# Patient Record
Sex: MALE | Race: WHITE | NOT HISPANIC OR LATINO | Employment: FULL TIME | URBAN - METROPOLITAN AREA
[De-identification: names, ages, dates, MRNs, and addresses within clinical notes are randomized per-mention and may not be internally consistent; named-entity substitution may affect disease eponyms.]

---

## 2023-08-03 PROBLEM — I21.19 STEMI INVOLVING OTH CORONARY ARTERY OF INFERIOR WALL: Status: ACTIVE | Noted: 2023-08-03

## 2023-08-04 PROBLEM — I21.11 ST ELEVATION MYOCARDIAL INFARCTION INVOLVING RIGHT CORONARY ARTERY: Status: ACTIVE | Noted: 2023-08-03

## 2023-08-04 PROBLEM — E66.09 CLASS 1 OBESITY DUE TO EXCESS CALORIES WITH SERIOUS COMORBIDITY AND BODY MASS INDEX (BMI) OF 31.0 TO 31.9 IN ADULT: Status: ACTIVE | Noted: 2023-08-04

## 2023-08-04 PROBLEM — E03.9 HYPOTHYROIDISM: Status: ACTIVE | Noted: 2023-08-04

## 2023-08-04 PROBLEM — E66.811 CLASS 1 OBESITY DUE TO EXCESS CALORIES WITH SERIOUS COMORBIDITY AND BODY MASS INDEX (BMI) OF 31.0 TO 31.9 IN ADULT: Status: ACTIVE | Noted: 2023-08-04

## 2023-08-14 ENCOUNTER — OFFICE VISIT (OUTPATIENT)
Dept: CARDIOLOGY | Facility: CLINIC | Age: 58
End: 2023-08-14
Payer: COMMERCIAL

## 2023-08-14 VITALS
WEIGHT: 228.19 LBS | HEIGHT: 71 IN | BODY MASS INDEX: 31.95 KG/M2 | DIASTOLIC BLOOD PRESSURE: 70 MMHG | OXYGEN SATURATION: 98 % | HEART RATE: 74 BPM | SYSTOLIC BLOOD PRESSURE: 132 MMHG

## 2023-08-14 DIAGNOSIS — I21.11 ST ELEVATION MYOCARDIAL INFARCTION INVOLVING RIGHT CORONARY ARTERY: Primary | ICD-10-CM

## 2023-08-14 DIAGNOSIS — E03.9 HYPOTHYROIDISM, UNSPECIFIED TYPE: ICD-10-CM

## 2023-08-14 DIAGNOSIS — E66.09 CLASS 1 OBESITY DUE TO EXCESS CALORIES WITH SERIOUS COMORBIDITY AND BODY MASS INDEX (BMI) OF 31.0 TO 31.9 IN ADULT: ICD-10-CM

## 2023-08-14 PROCEDURE — 99999 PR PBB SHADOW E&M-EST. PATIENT-LVL V: CPT | Mod: PBBFAC,,,

## 2023-08-14 PROCEDURE — 1159F PR MEDICATION LIST DOCUMENTED IN MEDICAL RECORD: ICD-10-PCS | Mod: CPTII,S$GLB,,

## 2023-08-14 PROCEDURE — 1159F MED LIST DOCD IN RCRD: CPT | Mod: CPTII,S$GLB,,

## 2023-08-14 PROCEDURE — 99214 OFFICE O/P EST MOD 30 MIN: CPT | Mod: S$GLB,,,

## 2023-08-14 PROCEDURE — 3075F PR MOST RECENT SYSTOLIC BLOOD PRESS GE 130-139MM HG: ICD-10-PCS | Mod: CPTII,S$GLB,,

## 2023-08-14 PROCEDURE — 3075F SYST BP GE 130 - 139MM HG: CPT | Mod: CPTII,S$GLB,,

## 2023-08-14 PROCEDURE — 3078F DIAST BP <80 MM HG: CPT | Mod: CPTII,S$GLB,,

## 2023-08-14 PROCEDURE — 1160F RVW MEDS BY RX/DR IN RCRD: CPT | Mod: CPTII,S$GLB,,

## 2023-08-14 PROCEDURE — 3008F PR BODY MASS INDEX (BMI) DOCUMENTED: ICD-10-PCS | Mod: CPTII,S$GLB,,

## 2023-08-14 PROCEDURE — 1111F DSCHRG MED/CURRENT MED MERGE: CPT | Mod: CPTII,S$GLB,,

## 2023-08-14 PROCEDURE — 99999 PR PBB SHADOW E&M-EST. PATIENT-LVL V: ICD-10-PCS | Mod: PBBFAC,,,

## 2023-08-14 PROCEDURE — 1160F PR REVIEW ALL MEDS BY PRESCRIBER/CLIN PHARMACIST DOCUMENTED: ICD-10-PCS | Mod: CPTII,S$GLB,,

## 2023-08-14 PROCEDURE — 3078F PR MOST RECENT DIASTOLIC BLOOD PRESSURE < 80 MM HG: ICD-10-PCS | Mod: CPTII,S$GLB,,

## 2023-08-14 PROCEDURE — 3008F BODY MASS INDEX DOCD: CPT | Mod: CPTII,S$GLB,,

## 2023-08-14 PROCEDURE — 1111F PR DISCHARGE MEDS RECONCILED W/ CURRENT OUTPATIENT MED LIST: ICD-10-PCS | Mod: CPTII,S$GLB,,

## 2023-08-14 PROCEDURE — 99214 PR OFFICE/OUTPT VISIT, EST, LEVL IV, 30-39 MIN: ICD-10-PCS | Mod: S$GLB,,,

## 2023-08-14 NOTE — PROGRESS NOTES
Subjective:    Patient ID:  Saroj Zavala III is a 57 y.o. male who presents for follow-up of Hospital Follow Up      PCP: Shabbir Kirkland III, MD (Inactive)     Referring Provider:     HPI: Patient is a 56 yo m w/PMH hypothyroidism, obesity,  inferior STEMI 8/3/23 S/P PCI mid RCA w OSVALDO X 1 Synergy XD 3.5x20MM by Dr. Kirkland. Patient presented to ED on 8/3/23 w c/o CP which started 3-4 HRs prior to arrival and he had stuttering cp which became constant over the past hour.  He denies sob however notes radiation of pain to his back and L arm. He was noted to be hemodynamically stable in the ED and given ASA, ticagrelor, and heparin bolus and was sent for emergent angiogram. Post angiogram received Tirofiban X 18 HR, repeat EKG reviewed w resolution of ST elevation. He was discharged home on medical therapy: Ticagrelor, ASA, metoprolol 25 mg, and Statin therapy.Since discharge patient reports doing well but notes some fatigue.  He denies CP, SOB, palpitations, orthopnea, PND, pre-syncope, LOC, or swelling. He notes medication compliance without side effects.         No past medical history on file.  Past Surgical History:   Procedure Laterality Date    IVUS, CORONARY  8/3/2023    Procedure: IVUS, Coronary;  Surgeon: Mt Kirkland III, MD;  Location: [facility] CATH LAB;  Service: Cardiology;;    LEFT HEART CATHETERIZATION Left 8/3/2023    Procedure: Left heart cath;  Surgeon: Mt Kirkland III, MD;  Location: [facility] CATH LAB;  Service: Cardiology;  Laterality: Left;    STENT, DRUG ELUTING, SINGLE VESSEL, CORONARY  8/3/2023    Procedure: Stent, Drug Eluting, Single Vessel, Coronary;  Surgeon: Mt Kirkland III, MD;  Location: [facility] CATH LAB;  Service: Cardiology;;     Social History     Socioeconomic History    Marital status:      Social Determinants of Health     Financial Resource Strain: Medium Risk (8/4/2023)    Overall Financial Resource Strain (CARDIA)     Difficulty of Paying Living  Expenses: Somewhat hard   Food Insecurity: No Food Insecurity (8/4/2023)    Hunger Vital Sign     Worried About Running Out of Food in the Last Year: Never true     Ran Out of Food in the Last Year: Never true   Transportation Needs: No Transportation Needs (8/4/2023)    PRAPARE - Transportation     Lack of Transportation (Medical): No     Lack of Transportation (Non-Medical): No   Physical Activity: Insufficiently Active (8/4/2023)    Exercise Vital Sign     Days of Exercise per Week: 3 days     Minutes of Exercise per Session: 40 min   Stress: Stress Concern Present (8/4/2023)    [employer] [place] [employer] - Occupational Stress Questionnaire     Feeling of Stress : Rather much   Social Connections: Unknown (8/4/2023)    Social Connection and Isolation Panel [NHANES]     Frequency of Communication with Friends and Family: More than three times a week     Frequency of Social Gatherings with Friends and Family: More than three times a week   Housing Stability: Unknown (8/4/2023)    Housing Stability Vital Sign     Unstable Housing in the Last Year: No     No family history on file.    Review of patient's allergies indicates:  No Known Allergies    Medication List with Changes/Refills   Current Medications    ASPIRIN 81 MG CHEW    Take 1 tablet (81 mg total) by mouth once daily.    ATORVASTATIN (LIPITOR) 40 MG TABLET    Take 1 tablet (40 mg total) by mouth every evening.    LEVOTHYROXINE (SYNTHROID, LEVOTHROID) 175 MCG TABLET    Take 175 mcg by mouth once daily.    METOPROLOL TARTRATE (LOPRESSOR) 25 MG TABLET    Take 1 tablet (25 mg total) by mouth 2 (two) times daily.    TESTOSTERONE CYPIONATE (DEPOTESTOTERONE CYPIONATE) 100 MG/ML INJECTION    Inject into the muscle every 14 (fourteen) days.   Changed and/or Refilled Medications    Modified Medication Previous Medication    TICAGRELOR (BRILINTA) 90 MG TABLET ticagrelor (BRILINTA) 90 mg tablet       Take 1 tablet (90 mg total) by mouth 2 (two) times  "daily.    Take 1 tablet (90 mg total) by mouth 2 (two) times daily.       Review of Systems   Constitutional: Negative for diaphoresis and fever.   HENT:  Negative for congestion and hearing loss.    Eyes:  Negative for blurred vision and pain.   Cardiovascular:  Negative for chest pain, claudication, dyspnea on exertion, leg swelling, near-syncope, palpitations and syncope.   Respiratory:  Negative for shortness of breath and sleep disturbances due to breathing.    Hematologic/Lymphatic: Negative for bleeding problem. Does not bruise/bleed easily.   Skin:  Negative for color change and poor wound healing.   Gastrointestinal:  Negative for abdominal pain and nausea.   Genitourinary:  Negative for bladder incontinence and flank pain.   Neurological:  Negative for focal weakness and light-headedness.        Objective:   /70 (BP Location: Right arm, Patient Position: Sitting, BP Method: X-Large (Manual))   Pulse 74   Ht 5' 11" (1.803 m)   Wt 103.5 kg (228 lb 3.2 oz)   SpO2 98%   BMI 31.83 kg/m²    Physical Exam  Constitutional:       Appearance: He is well-developed. He is not diaphoretic.   HENT:      Head: Normocephalic and atraumatic.   Eyes:      General: No scleral icterus.     Pupils: Pupils are equal, round, and reactive to light.   Neck:      Vascular: No JVD.   Cardiovascular:      Rate and Rhythm: Normal rate and regular rhythm.      Pulses: Intact distal pulses.      Heart sounds: S1 normal and S2 normal. No murmur heard.     No friction rub. No gallop.   Pulmonary:      Effort: Pulmonary effort is normal. No respiratory distress.      Breath sounds: Normal breath sounds. No wheezing or rales.   Chest:      Chest wall: No tenderness.   Abdominal:      General: Bowel sounds are normal. There is no distension.      Palpations: Abdomen is soft. There is no mass.      Tenderness: There is no abdominal tenderness. There is no rebound.   Musculoskeletal:         General: No tenderness. Normal range of " motion.      Cervical back: Normal range of motion and neck supple.   Skin:     General: Skin is warm and dry.      Coloration: Skin is not pale.   Neurological:      Mental Status: He is alert and oriented to person, place, and time.      Coordination: Coordination normal.      Deep Tendon Reflexes: Reflexes normal.   Psychiatric:         Behavior: Behavior normal.         Judgment: Judgment normal.           Assessment:       1. ST elevation myocardial infarction involving right coronary artery    2. Class 1 obesity due to excess calories with serious comorbidity and body mass index (BMI) of 31.0 to 31.9 in adult    3. Hypothyroidism, unspecified type         Plan:         ST elevation myocardial infarction involving right coronary artery  -S/P [facility] 8/3/23  Summary         There was single vessel coronary artery disease with a 75% thrombotic tubular stenosis of the mid RCA with distal embolization to daital RPLV status post successful PCI with a 3.5x20 mm OSVALDO with postdilation with a 4.0x20 mm NCB with excellent angiographic results.    The Mid RCA lesion was 75% stenosed with 0% stenosis post-intervention.    The left ventricular end diastolic pressure was elevated, LVEDP 22 mmHg.    The pre-procedure left ventricular end diastolic pressure was 22.    Mid RCA lesion: A STENT SYNERGY XD 3.5X20MM stent was successfully placed at 16 NABOR for 20 sec.    The estimated blood loss was <50 mL    -continue ASA 81 mg, metoprolol 25 mg  -continue Ticagrelor X 1 year   -Cardiac rehab referral.  -continue Statin therapy- atorvastatin 40 mg         Class 1 obesity due to excess calories with serious comorbidity and body mass index (BMI) of 31.0 to 31.9 in adult  -discussed lifestyle modifications- diet exercise, weight loss  -notes he has made diet modifications to cardiac healthy diet     Hypothyroidism  -followed by PCP  -Continue medical therapy       Total duration of face to face visit time 30 minutes.  Total time spent  counseling greater than fifty percent of total visit time.  Counseling included discussion regarding imaging findings, diagnosis, possibilities, treatment options, risks and benefits.  The patient had many questions regarding the options and long-term effects      Taran Zhu NP  Cardiology

## 2023-08-14 NOTE — ASSESSMENT & PLAN NOTE
-discussed lifestyle modifications- diet exercise, weight loss  -notes he has made diet modifications to cardiac healthy diet

## 2023-08-14 NOTE — ASSESSMENT & PLAN NOTE
-S/P [facility] 8/3/23  Summary         There was single vessel coronary artery disease with a 75% thrombotic tubular stenosis of the mid RCA with distal embolization to daital RPLV status post successful PCI with a 3.5x20 mm OSVALDO with postdilation with a 4.0x20 mm NCB with excellent angiographic results.    The Mid RCA lesion was 75% stenosed with 0% stenosis post-intervention.    The left ventricular end diastolic pressure was elevated, LVEDP 22 mmHg.    The pre-procedure left ventricular end diastolic pressure was 22.    Mid RCA lesion: A STENT SYNERGY XD 3.5X20MM stent was successfully placed at 16 NABOR for 20 sec.    The estimated blood loss was <50 mL    -continue ASA 81 mg, metoprolol 25 mg  -continue Ticagrelor X 1 year   -Cardiac rehab referral.  -continue Statin therapy- atorvastatin 40 mg

## 2023-11-06 PROBLEM — I21.11 ST ELEVATION MYOCARDIAL INFARCTION INVOLVING RIGHT CORONARY ARTERY: Status: RESOLVED | Noted: 2023-08-03 | Resolved: 2023-11-06

## 2023-11-21 ENCOUNTER — OFFICE VISIT (OUTPATIENT)
Dept: CARDIOLOGY | Facility: CLINIC | Age: 58
End: 2023-11-21
Payer: COMMERCIAL

## 2023-11-21 VITALS
DIASTOLIC BLOOD PRESSURE: 80 MMHG | BODY MASS INDEX: 31.92 KG/M2 | OXYGEN SATURATION: 99 % | HEIGHT: 71 IN | HEART RATE: 74 BPM | SYSTOLIC BLOOD PRESSURE: 138 MMHG | WEIGHT: 228 LBS

## 2023-11-21 DIAGNOSIS — I25.10 ATHEROSCLEROSIS OF NATIVE CORONARY ARTERY OF NATIVE HEART WITHOUT ANGINA PECTORIS: Primary | ICD-10-CM

## 2023-11-21 DIAGNOSIS — E03.9 HYPOTHYROIDISM, UNSPECIFIED TYPE: ICD-10-CM

## 2023-11-21 DIAGNOSIS — G47.33 OSA (OBSTRUCTIVE SLEEP APNEA): ICD-10-CM

## 2023-11-21 DIAGNOSIS — R53.83 OTHER FATIGUE: ICD-10-CM

## 2023-11-21 DIAGNOSIS — E66.09 CLASS 1 OBESITY DUE TO EXCESS CALORIES WITH SERIOUS COMORBIDITY AND BODY MASS INDEX (BMI) OF 31.0 TO 31.9 IN ADULT: ICD-10-CM

## 2023-11-21 PROCEDURE — 99999 PR PBB SHADOW E&M-EST. PATIENT-LVL III: ICD-10-PCS | Mod: PBBFAC,,,

## 2023-11-21 PROCEDURE — 1159F MED LIST DOCD IN RCRD: CPT | Mod: CPTII,S$GLB,,

## 2023-11-21 PROCEDURE — 3079F PR MOST RECENT DIASTOLIC BLOOD PRESSURE 80-89 MM HG: ICD-10-PCS | Mod: CPTII,S$GLB,,

## 2023-11-21 PROCEDURE — 3075F SYST BP GE 130 - 139MM HG: CPT | Mod: CPTII,S$GLB,,

## 2023-11-21 PROCEDURE — 99999 PR PBB SHADOW E&M-EST. PATIENT-LVL III: CPT | Mod: PBBFAC,,,

## 2023-11-21 PROCEDURE — 3075F PR MOST RECENT SYSTOLIC BLOOD PRESS GE 130-139MM HG: ICD-10-PCS | Mod: CPTII,S$GLB,,

## 2023-11-21 PROCEDURE — 1159F PR MEDICATION LIST DOCUMENTED IN MEDICAL RECORD: ICD-10-PCS | Mod: CPTII,S$GLB,,

## 2023-11-21 PROCEDURE — 3008F PR BODY MASS INDEX (BMI) DOCUMENTED: ICD-10-PCS | Mod: CPTII,S$GLB,,

## 2023-11-21 PROCEDURE — 99214 PR OFFICE/OUTPT VISIT, EST, LEVL IV, 30-39 MIN: ICD-10-PCS | Mod: S$GLB,,,

## 2023-11-21 PROCEDURE — 3008F BODY MASS INDEX DOCD: CPT | Mod: CPTII,S$GLB,,

## 2023-11-21 PROCEDURE — 99214 OFFICE O/P EST MOD 30 MIN: CPT | Mod: S$GLB,,,

## 2023-11-21 PROCEDURE — 3079F DIAST BP 80-89 MM HG: CPT | Mod: CPTII,S$GLB,,

## 2023-11-21 RX ORDER — METOPROLOL TARTRATE 25 MG/1
12.5 TABLET, FILM COATED ORAL 2 TIMES DAILY
Qty: 45 TABLET | Refills: 3 | Status: SHIPPED | OUTPATIENT
Start: 2023-11-21 | End: 2024-11-20

## 2023-11-21 NOTE — PROGRESS NOTES
Subjective:    Patient ID:  Saroj Zavala III is a 58 y.o. male who presents for follow-up of Follow-up (3 month f/u)      PCP: Shabbir Kirkland III, MD (Inactive)     Referring Provider:     HPI: Patient is a 58 yo m w/PMH hypothyroidism, obesity,  inferior STEMI 8/3/23 S/P PCI mid RCA w OSVALDO X 1 Synergy XD 3.5x20MM by Dr. Kirkland. Patient presented to ED on 8/3/23 w c/o CP which started 3-4 HRs prior to arrival and he had stuttering cp which became constant over the past hour.  He denies sob however notes radiation of pain to his back and L arm. He was noted to be hemodynamically stable in the ED and given ASA, ticagrelor, and heparin bolus and was sent for emergent angiogram. Post angiogram received Tirofiban X 18 HR, repeat EKG reviewed w resolution of ST elevation. He was discharged home on medical therapy: Ticagrelor, ASA, metoprolol 25 mg, and Statin therapy.Since discharge patient reports doing well but notes some fatigue.  He denies CP, SOB, palpitations, orthopnea, PND, pre-syncope, LOC, or swelling. He notes medication compliance without side effects.       11/21/23: Patient presents today for f/u appt. He was last seen on 8/14/23 S/P PCI to mid RCA and was continued on medical therapy. He reports doing well since last visit. He also reports feeling very sleepy and slow. He noted 1 episode of right sided CP which was relieved by 1 SL nitro.He denies CP, SOB, palpitations, orthopnea, PND, pre-syncope, LOC, or swelling. He notes medication compliance without side effects. He notes intermittent dietary compliance with low fat/ low cholesterol diet.          No past medical history on file.  Past Surgical History:   Procedure Laterality Date    IVUS, CORONARY  8/3/2023    Procedure: IVUS, Coronary;  Surgeon: Mt Kirkland III, MD;  Location: [facility] CATH LAB;  Service: Cardiology;;    LEFT HEART CATHETERIZATION Left 8/3/2023    Procedure: Left heart cath;  Surgeon: Mt Kirkland III, MD;   Location: [facility] CATH LAB;  Service: Cardiology;  Laterality: Left;    STENT, DRUG ELUTING, SINGLE VESSEL, CORONARY  8/3/2023    Procedure: Stent, Drug Eluting, Single Vessel, Coronary;  Surgeon: Mt Kirkland III, MD;  Location: [facility] CATH LAB;  Service: Cardiology;;     Social History     Socioeconomic History    Marital status:      Social Determinants of Health     Financial Resource Strain: Medium Risk (8/4/2023)    Overall Financial Resource Strain (CARDIA)     Difficulty of Paying Living Expenses: Somewhat hard   Food Insecurity: No Food Insecurity (8/4/2023)    Hunger Vital Sign     Worried About Running Out of Food in the Last Year: Never true     Ran Out of Food in the Last Year: Never true   Transportation Needs: No Transportation Needs (8/4/2023)    PRAPARE - Transportation     Lack of Transportation (Medical): No     Lack of Transportation (Non-Medical): No   Physical Activity: Insufficiently Active (8/4/2023)    Exercise Vital Sign     Days of Exercise per Week: 3 days     Minutes of Exercise per Session: 40 min   Stress: Stress Concern Present (8/4/2023)    [employer] [employer] - Occupational Stress Questionnaire     Feeling of Stress : Rather much   Social Connections: Unknown (8/4/2023)    Social Connection and Isolation Panel [NHANES]     Frequency of Communication with Friends and Family: More than three times a week     Frequency of Social Gatherings with Friends and Family: More than three times a week   Housing Stability: Unknown (8/4/2023)    Housing Stability Vital Sign     Unstable Housing in the Last Year: No     No family history on file.    Review of patient's allergies indicates:  No Known Allergies    Medication List with Changes/Refills   New Medications    METOPROLOL TARTRATE (LOPRESSOR) 25 MG TABLET    Take 0.5 tablets (12.5 mg total) by mouth 2 (two) times daily.   Current Medications    ASPIRIN 81 MG CHEW    Take 1 tablet (81 mg total) by mouth once  "daily.    ATORVASTATIN (LIPITOR) 40 MG TABLET    Take 1 tablet (40 mg total) by mouth every evening.    LEVOTHYROXINE (SYNTHROID, LEVOTHROID) 175 MCG TABLET    Take 175 mcg by mouth once daily.    TESTOSTERONE CYPIONATE (DEPOTESTOTERONE CYPIONATE) 100 MG/ML INJECTION    Inject into the muscle every 14 (fourteen) days.    TICAGRELOR (BRILINTA) 90 MG TABLET    Take 1 tablet (90 mg total) by mouth 2 (two) times daily.   Discontinued Medications    METOPROLOL TARTRATE (LOPRESSOR) 25 MG TABLET    Take 1 tablet (25 mg total) by mouth 2 (two) times daily.       Review of Systems   Constitutional: Negative for diaphoresis and fever.   HENT:  Negative for congestion and hearing loss.    Eyes:  Negative for blurred vision and pain.   Cardiovascular:  Negative for chest pain, claudication, dyspnea on exertion, leg swelling, near-syncope, palpitations and syncope.   Respiratory:  Negative for shortness of breath and sleep disturbances due to breathing.    Hematologic/Lymphatic: Negative for bleeding problem. Does not bruise/bleed easily.   Skin:  Negative for color change and poor wound healing.   Gastrointestinal:  Negative for abdominal pain and nausea.   Genitourinary:  Negative for bladder incontinence and flank pain.   Neurological:  Positive for excessive daytime sleepiness. Negative for focal weakness and light-headedness.        Objective:   /80 (BP Location: Left arm, Patient Position: Sitting, BP Method: Large (Manual))   Pulse 74   Ht 5' 11" (1.803 m)   Wt 103.4 kg (228 lb)   SpO2 99%   BMI 31.80 kg/m²    Physical Exam  Constitutional:       Appearance: He is well-developed. He is not diaphoretic.   HENT:      Head: Normocephalic and atraumatic.   Eyes:      General: No scleral icterus.     Pupils: Pupils are equal, round, and reactive to light.   Neck:      Vascular: No JVD.   Cardiovascular:      Rate and Rhythm: Normal rate and regular rhythm.      Pulses: Intact distal pulses.      Heart sounds: S1 " normal and S2 normal. No murmur heard.     No friction rub. No gallop.   Pulmonary:      Effort: Pulmonary effort is normal. No respiratory distress.      Breath sounds: Normal breath sounds. No wheezing or rales.   Chest:      Chest wall: No tenderness.   Abdominal:      General: Bowel sounds are normal. There is no distension.      Palpations: Abdomen is soft. There is no mass.      Tenderness: There is no abdominal tenderness. There is no rebound.   Musculoskeletal:         General: No tenderness. Normal range of motion.      Cervical back: Normal range of motion and neck supple.   Skin:     General: Skin is warm and dry.      Coloration: Skin is not pale.   Neurological:      Mental Status: He is alert and oriented to person, place, and time.      Coordination: Coordination normal.      Deep Tendon Reflexes: Reflexes normal.   Psychiatric:         Behavior: Behavior normal.         Judgment: Judgment normal.             Cardiac echo 8/4/23  Summary         Left Ventricle: The left ventricle is normal in size. Normal wall thickness. Normal wall motion. There is normal systolic function with a visually estimated ejection fraction of 60 - 65%. There is normal diastolic function.    Right Ventricle: Normal right ventricular cavity size. Systolic function is normal.    Aortic Valve: The aortic valve is a trileaflet valve. There is mild aortic regurgitation with a centrally directed jet.    Mitral Valve: There is no stenosis. There is mild regurgitation with a centrally directed jet.    Tricuspid Valve: There is mild regurgitation with a centrally directed jet.    Pulmonic Valve: There is no significant stenosis. The estimated PA systolic pressure is 36 mmHg.    IVC/SVC: Intermediate venous pressure at 8 mmHg.      Cardiac angiogram 8/3/23    Summary         There was single vessel coronary artery disease with a 75% thrombotic tubular stenosis of the mid RCA with distal embolization to daital RPLV status post  successful PCI with a 3.5x20 mm OSVALDO with postdilation with a 4.0x20 mm NCB with excellent angiographic results.    The Mid RCA lesion was 75% stenosed with 0% stenosis post-intervention.    The left ventricular end diastolic pressure was elevated, LVEDP 22 mmHg.    The pre-procedure left ventricular end diastolic pressure was 22.    Mid RCA lesion: A STENT SYNERGY XD 3.5X20MM stent was successfully placed at 16 NABOR for 20 sec.    The estimated blood loss was <50 mL.    Recommendations     Routine post PCI care.   Maximize medical management.   ASA 81mg.   Cardiac rehab referral.   Weight loss.   Statin therapy.   Ticagrelor (Brilinta) for one year.         Assessment:       1. Atherosclerosis of native coronary artery of native heart without angina pectoris    2. Hypothyroidism, unspecified type    3. Class 1 obesity due to excess calories with serious comorbidity and body mass index (BMI) of 31.0 to 31.9 in adult    4. ZOE (obstructive sleep apnea)    5. Other fatigue           Plan:         Atherosclerosis of native coronary artery of native heart without angina pectoris  S/P [facility] 8/3/23  Summary     There was single vessel coronary artery disease with a 75% thrombotic tubular stenosis of the mid RCA with distal embolization to daital RPLV status post successful PCI with a 3.5x20 mm OSVALDO with postdilation with a 4.0x20 mm NCB with excellent angiographic results.    The Mid RCA lesion was 75% stenosed with 0% stenosis post-intervention.    Mid RCA lesion: A STENT SYNERGY XD 3.5X20MM stent was successfully placed at 16 NABOR for 20 sec     -continue ASA 81 mg,  -decrease  metoprolol to 12.5 mg as patient reports sleepiness and tired feeling   -continue Ticagrelor X 1 year   -Cardiac rehab referral.  -continue Statin therapy- atorvastatin 40 mg     Hypothyroidism  -followed by PCP  -Continue medical therapy- synthroid 175 mcg     Class 1 obesity due to excess calories with serious comorbidity and body mass index (BMI) of  31.0 to 31.9 in adult  -discussed lifestyle modifications- diet exercise, weight loss  -notes he has made diet modifications to cardiac healthy diet     ZOE (obstructive sleep apnea)  -compliant w nightly CPAP     Other fatigue  -Cardiac echo to evaluate heart function         Total duration of face to face visit time 30 minutes.  Total time spent counseling greater than fifty percent of total visit time.  Counseling included discussion regarding imaging findings, diagnosis, possibilities, treatment options, risks and benefits.  The patient had many questions regarding the options and long-term effects      Taran Zhu NP  Cardiology

## 2023-11-21 NOTE — ASSESSMENT & PLAN NOTE
S/P [facility] 8/3/23  Summary     There was single vessel coronary artery disease with a 75% thrombotic tubular stenosis of the mid RCA with distal embolization to daital RPLV status post successful PCI with a 3.5x20 mm OSVALDO with postdilation with a 4.0x20 mm NCB with excellent angiographic results.    The Mid RCA lesion was 75% stenosed with 0% stenosis post-intervention.    Mid RCA lesion: A STENT SYNERGY XD 3.5X20MM stent was successfully placed at 16 NABOR for 20 sec     -continue ASA 81 mg,  -decrease  metoprolol to 12.5 mg as patient reports sleepiness and tired feeling   -continue Ticagrelor X 1 year   -Cardiac rehab referral.  -continue Statin therapy- atorvastatin 40 mg

## 2024-02-21 ENCOUNTER — OFFICE VISIT (OUTPATIENT)
Dept: CARDIOLOGY | Facility: CLINIC | Age: 59
End: 2024-02-21
Payer: COMMERCIAL

## 2024-02-21 VITALS
SYSTOLIC BLOOD PRESSURE: 136 MMHG | HEART RATE: 62 BPM | OXYGEN SATURATION: 96 % | WEIGHT: 227.94 LBS | HEIGHT: 71 IN | DIASTOLIC BLOOD PRESSURE: 80 MMHG | BODY MASS INDEX: 31.91 KG/M2

## 2024-02-21 DIAGNOSIS — R53.83 OTHER FATIGUE: ICD-10-CM

## 2024-02-21 DIAGNOSIS — R06.02 SOBOE (SHORTNESS OF BREATH ON EXERTION): ICD-10-CM

## 2024-02-21 DIAGNOSIS — E03.9 HYPOTHYROIDISM, UNSPECIFIED TYPE: ICD-10-CM

## 2024-02-21 DIAGNOSIS — E66.09 CLASS 1 OBESITY DUE TO EXCESS CALORIES WITH SERIOUS COMORBIDITY AND BODY MASS INDEX (BMI) OF 31.0 TO 31.9 IN ADULT: ICD-10-CM

## 2024-02-21 DIAGNOSIS — I25.10 ATHEROSCLEROSIS OF NATIVE CORONARY ARTERY OF NATIVE HEART WITHOUT ANGINA PECTORIS: Primary | ICD-10-CM

## 2024-02-21 DIAGNOSIS — G47.33 OSA (OBSTRUCTIVE SLEEP APNEA): ICD-10-CM

## 2024-02-21 PROCEDURE — 1160F RVW MEDS BY RX/DR IN RCRD: CPT | Mod: CPTII,S$GLB,,

## 2024-02-21 PROCEDURE — 99999 PR PBB SHADOW E&M-EST. PATIENT-LVL III: CPT | Mod: PBBFAC,,,

## 2024-02-21 PROCEDURE — 99214 OFFICE O/P EST MOD 30 MIN: CPT | Mod: S$GLB,,,

## 2024-02-21 PROCEDURE — 1159F MED LIST DOCD IN RCRD: CPT | Mod: CPTII,S$GLB,,

## 2024-02-21 PROCEDURE — 3079F DIAST BP 80-89 MM HG: CPT | Mod: CPTII,S$GLB,,

## 2024-02-21 PROCEDURE — 3075F SYST BP GE 130 - 139MM HG: CPT | Mod: CPTII,S$GLB,,

## 2024-02-21 PROCEDURE — 3008F BODY MASS INDEX DOCD: CPT | Mod: CPTII,S$GLB,,

## 2024-02-21 RX ORDER — ATORVASTATIN CALCIUM 40 MG/1
1 TABLET, FILM COATED ORAL NIGHTLY
COMMUNITY
Start: 2023-08-05 | End: 2024-02-21 | Stop reason: SDUPTHER

## 2024-02-21 RX ORDER — LEVOTHYROXINE SODIUM 175 UG/1
175 TABLET ORAL
COMMUNITY
Start: 2023-11-20 | End: 2024-02-21 | Stop reason: SDUPTHER

## 2024-02-21 NOTE — ASSESSMENT & PLAN NOTE
-Cardiac echo 11/24/23- LVEF 55% w normal diastolic function   -Refer to Pulmonary medicine for further evaluation

## 2024-02-21 NOTE — ASSESSMENT & PLAN NOTE
S/P [facility] 8/3/23  Summary     There was single vessel coronary artery disease with a 75% thrombotic tubular stenosis of the mid RCA with distal embolization to daital RPLV status post successful PCI with a 3.5x20 mm OSVALDO with postdilation with a 4.0x20 mm NCB with excellent angiographic results.    The Mid RCA lesion was 75% stenosed with 0% stenosis post-intervention.    Mid RCA lesion: A STENT SYNERGY XD 3.5X20MM stent was successfully placed at 16 NABOR for 20 sec     -continue ASA 81 mg,  -continue metoprolol to 12.5 mg as patient reports sleepiness and tired feeling   -continue Ticagrelor X 1 year   -Cardiac rehab referral.  -continue Statin therapy- atorvastatin 40 mg

## 2024-02-21 NOTE — PROGRESS NOTES
Subjective:    Patient ID:  Saroj Zavala III is a 58 y.o. male who presents for follow-up of No chief complaint on file.      PCP: Shabbir Kirkland III, MD (Inactive)     Referring Provider:     HPI: Patient is a 56 yo m w/PMH hypothyroidism, obesity,  inferior STEMI 8/3/23 S/P PCI mid RCA w OSVALDO X 1 Synergy XD 3.5x20MM by Dr. Kirkland. Patient presented to ED on 8/3/23 w c/o CP which started 3-4 HRs prior to arrival and he had stuttering cp which became constant over the past hour.  He denies sob however notes radiation of pain to his back and L arm. He was noted to be hemodynamically stable in the ED and given ASA, ticagrelor, and heparin bolus and was sent for emergent angiogram. Post angiogram received Tirofiban X 18 HR, repeat EKG reviewed w resolution of ST elevation. He was discharged home on medical therapy: Ticagrelor, ASA, metoprolol 25 mg, and Statin therapy.Since discharge patient reports doing well but notes some fatigue.  He denies CP, SOB, palpitations, orthopnea, PND, pre-syncope, LOC, or swelling. He notes medication compliance without side effects.       11/21/23: Patient presents today for f/u appt. He was last seen on 8/14/23 S/P PCI to mid RCA and was continued on medical therapy. He reports doing well since last visit. He also reports feeling very sleepy and slow. He noted 1 episode of right sided CP which was relieved by 1 SL nitro.He denies CP, SOB, palpitations, orthopnea, PND, pre-syncope, LOC, or swelling. He notes medication compliance without side effects. He notes intermittent dietary compliance with low fat/ low cholesterol diet.     2/24/24: Patient presents today for f/u appt. He was last seen on 11/21/23 for f/u appt and reported fatigue. He was continued on medical therapy w metoprolol decreased to 12.5 mg. Repeat cardiac echo w normal LVEF 55%. He continues to note some GREEN w exertion, but notes the fatigue has decreased with the lower dose of metoprolol.   He denies CP,  SOB, palpitations, orthopnea, PND, pre-syncope, LOC, or swelling. He notes medication compliance without side effects. He notes intermittent dietary compliance with low fat/ low cholesterol diet.     No past medical history on file.  Past Surgical History:   Procedure Laterality Date    IVUS, CORONARY  8/3/2023    Procedure: IVUS, Coronary;  Surgeon: Mt Kirkland III, MD;  Location: [facility] CATH LAB;  Service: Cardiology;;    LEFT HEART CATHETERIZATION Left 8/3/2023    Procedure: Left heart cath;  Surgeon: Mt Kirkland III, MD;  Location: [facility] CATH LAB;  Service: Cardiology;  Laterality: Left;    STENT, DRUG ELUTING, SINGLE VESSEL, CORONARY  8/3/2023    Procedure: Stent, Drug Eluting, Single Vessel, Coronary;  Surgeon: Mt Kirkland III, MD;  Location: [facility] CATH LAB;  Service: Cardiology;;     Social History     Socioeconomic History    Marital status:    Tobacco Use    Smoking status: Never    Smokeless tobacco: Never     Social Determinants of Health     Financial Resource Strain: Medium Risk (8/4/2023)    Overall Financial Resource Strain (CARDIA)     Difficulty of Paying Living Expenses: Somewhat hard   Food Insecurity: No Food Insecurity (8/4/2023)    Hunger Vital Sign     Worried About Running Out of Food in the Last Year: Never true     Ran Out of Food in the Last Year: Never true   Transportation Needs: No Transportation Needs (8/4/2023)    PRAPARE - Transportation     Lack of Transportation (Medical): No     Lack of Transportation (Non-Medical): No   Physical Activity: Insufficiently Active (8/4/2023)    Exercise Vital Sign     Days of Exercise per Week: 3 days     Minutes of Exercise per Session: 40 min   Stress: Stress Concern Present (8/4/2023)    Citizen of [place] [employer] - Occupational Stress Questionnaire     Feeling of Stress : Rather much   Social Connections: Unknown (8/4/2023)    Social Connection and Isolation Panel [NHANES]     Frequency of Communication  with Friends and Family: More than three times a week     Frequency of Social Gatherings with Friends and Family: More than three times a week   Housing Stability: Unknown (8/4/2023)    Housing Stability Vital Sign     Unstable Housing in the Last Year: No     No family history on file.    Review of patient's allergies indicates:  No Known Allergies    Medication List with Changes/Refills   Current Medications    ALBUTEROL (PROVENTIL/VENTOLIN HFA) 90 MCG/ACTUATION INHALER    Inhale 2 puffs into the lungs every 6 (six) hours as needed for Wheezing (Cough).    ASPIRIN 81 MG CHEW    Take 1 tablet (81 mg total) by mouth once daily.    ATORVASTATIN (LIPITOR) 40 MG TABLET    Take 1 tablet (40 mg total) by mouth every evening.    LEVOTHYROXINE (SYNTHROID, LEVOTHROID) 175 MCG TABLET    Take 175 mcg by mouth once daily.    METOPROLOL TARTRATE (LOPRESSOR) 25 MG TABLET    Take 0.5 tablets (12.5 mg total) by mouth 2 (two) times daily.    TESTOSTERONE CYPIONATE IM    Inject 50 mg into the muscle.    TICAGRELOR (BRILINTA) 90 MG TABLET    Take 1 tablet (90 mg total) by mouth 2 (two) times daily.   Discontinued Medications    ATORVASTATIN (LIPITOR) 40 MG TABLET    Take 1 tablet by mouth every evening.    LEVOTHYROXINE (SYNTHROID, LEVOTHROID) 175 MCG TABLET    Take 175 mcg by mouth.    TESTOSTERONE CYPIONATE (DEPOTESTOTERONE CYPIONATE) 100 MG/ML INJECTION    Inject into the muscle every 14 (fourteen) days.       Review of Systems   Constitutional: Negative for diaphoresis and fever.   HENT:  Negative for congestion and hearing loss.    Eyes:  Negative for blurred vision and pain.   Cardiovascular:  Positive for dyspnea on exertion. Negative for chest pain, claudication, leg swelling, near-syncope, palpitations and syncope.   Respiratory:  Positive for snoring. Negative for shortness of breath and sleep disturbances due to breathing.    Hematologic/Lymphatic: Negative for bleeding problem. Does not bruise/bleed easily.   Skin:   "Negative for color change and poor wound healing.   Gastrointestinal:  Negative for abdominal pain and nausea.   Genitourinary:  Negative for bladder incontinence and flank pain.   Neurological:  Negative for focal weakness and light-headedness.        Objective:   /80 (BP Location: Right arm, Patient Position: Sitting, BP Method: Medium (Manual))   Pulse 62   Ht 5' 11" (1.803 m)   Wt 103.4 kg (227 lb 15.3 oz)   SpO2 96%   BMI 31.79 kg/m²    Physical Exam  Constitutional:       Appearance: He is well-developed. He is not diaphoretic.   HENT:      Head: Normocephalic and atraumatic.   Eyes:      General: No scleral icterus.     Pupils: Pupils are equal, round, and reactive to light.   Neck:      Vascular: No JVD.   Cardiovascular:      Rate and Rhythm: Normal rate and regular rhythm.      Pulses: Intact distal pulses.      Heart sounds: S1 normal and S2 normal. No murmur heard.     No friction rub. No gallop.   Pulmonary:      Effort: Pulmonary effort is normal. No respiratory distress.      Breath sounds: Normal breath sounds. No wheezing or rales.   Chest:      Chest wall: No tenderness.   Abdominal:      General: Bowel sounds are normal. There is no distension.      Palpations: Abdomen is soft. There is no mass.      Tenderness: There is no abdominal tenderness. There is no rebound.   Musculoskeletal:         General: No tenderness. Normal range of motion.      Cervical back: Normal range of motion and neck supple.   Skin:     General: Skin is warm and dry.      Coloration: Skin is not pale.   Neurological:      Mental Status: He is alert and oriented to person, place, and time.      Coordination: Coordination normal.      Deep Tendon Reflexes: Reflexes normal.   Psychiatric:         Behavior: Behavior normal.         Judgment: Judgment normal.             Cardiac echo 11/24/23  Summary         Left Ventricle: The left ventricle is normal in size. There is mild concentric hypertrophy. Normal wall " motion. There is normal systolic function. Biplane (2D) method of discs ejection fraction is 55%. There is normal diastolic function.    Right Ventricle: Normal right ventricular cavity size. Wall thickness is normal. Right ventricle wall motion  is normal. Systolic function is normal.    Aortic Valve: There is mild aortic regurgitation.    Mitral Valve: There is mild regurgitation with a posterolateral eccentriccally directed jet.    Pulmonary Artery: The estimated pulmonary artery systolic pressure is 19 mmHg.    IVC/SVC: Intermediate venous pressure at 8 mmHg.        Cardiac echo 8/4/23  Summary         Left Ventricle: The left ventricle is normal in size. Normal wall thickness. Normal wall motion. There is normal systolic function with a visually estimated ejection fraction of 60 - 65%. There is normal diastolic function.    Right Ventricle: Normal right ventricular cavity size. Systolic function is normal.    Aortic Valve: The aortic valve is a trileaflet valve. There is mild aortic regurgitation with a centrally directed jet.    Mitral Valve: There is no stenosis. There is mild regurgitation with a centrally directed jet.    Tricuspid Valve: There is mild regurgitation with a centrally directed jet.    Pulmonic Valve: There is no significant stenosis. The estimated PA systolic pressure is 36 mmHg.    IVC/SVC: Intermediate venous pressure at 8 mmHg.      Cardiac angiogram 8/3/23    Summary         There was single vessel coronary artery disease with a 75% thrombotic tubular stenosis of the mid RCA with distal embolization to daital RPLV status post successful PCI with a 3.5x20 mm OSVALDO with postdilation with a 4.0x20 mm NCB with excellent angiographic results.    The Mid RCA lesion was 75% stenosed with 0% stenosis post-intervention.    The left ventricular end diastolic pressure was elevated, LVEDP 22 mmHg.    The pre-procedure left ventricular end diastolic pressure was 22.    Mid RCA lesion: A STENT SYNERGY XD  3.5X20MM stent was successfully placed at 16 NABOR for 20 sec.    The estimated blood loss was <50 mL.    Recommendations     Routine post PCI care.   Maximize medical management.   ASA 81mg.   Cardiac rehab referral.   Weight loss.   Statin therapy.   Ticagrelor (Brilinta) for one year.         Assessment:       1. Atherosclerosis of native coronary artery of native heart without angina pectoris    2. Class 1 obesity due to excess calories with serious comorbidity and body mass index (BMI) of 31.0 to 31.9 in adult    3. ZOE (obstructive sleep apnea)    4. Other fatigue    5. Hypothyroidism, unspecified type    6. SOBOE (shortness of breath on exertion)             Plan:         Atherosclerosis of native coronary artery of native heart without angina pectoris  S/P [facility] 8/3/23  Summary     There was single vessel coronary artery disease with a 75% thrombotic tubular stenosis of the mid RCA with distal embolization to daital RPLV status post successful PCI with a 3.5x20 mm OSVALDO with postdilation with a 4.0x20 mm NCB with excellent angiographic results.    The Mid RCA lesion was 75% stenosed with 0% stenosis post-intervention.    Mid RCA lesion: A STENT SYNERGY XD 3.5X20MM stent was successfully placed at 16 NABOR for 20 sec     -continue ASA 81 mg,  -continue metoprolol to 12.5 mg as patient reports sleepiness and tired feeling   -continue Ticagrelor X 1 year   -Cardiac rehab referral.  -continue Statin therapy- atorvastatin 40 mg     Class 1 obesity due to excess calories with serious comorbidity and body mass index (BMI) of 31.0 to 31.9 in adult  -discussed lifestyle modifications- diet exercise, weight loss  -notes he has made diet modifications to cardiac healthy diet   -lost 7 pounds since last visit     ZOE (obstructive sleep apnea)  -compliant w nightly CPAP     Other fatigue  -Cardiac echo 11/24/23- LVEF 55% w normal diastolic function     Hypothyroidism  -followed by PCP  -Continue medical therapy- synthroid 175  mcg     SOBOE (shortness of breath on exertion)  -Cardiac echo 11/24/23- LVEF 55% w normal diastolic function   -Refer to Pulmonary medicine for further evaluation           Total duration of face to face visit time 30 minutes.  Total time spent counseling greater than fifty percent of total visit time.  Counseling included discussion regarding imaging findings, diagnosis, possibilities, treatment options, risks and benefits.  The patient had many questions regarding the options and long-term effects      Taran Zhu NP  Cardiology

## 2024-02-21 NOTE — ASSESSMENT & PLAN NOTE
-discussed lifestyle modifications- diet exercise, weight loss  -notes he has made diet modifications to cardiac healthy diet   -lost 7 pounds since last visit

## 2024-03-08 ENCOUNTER — OFFICE VISIT (OUTPATIENT)
Dept: PULMONOLOGY | Facility: CLINIC | Age: 59
End: 2024-03-08
Payer: COMMERCIAL

## 2024-03-08 VITALS
OXYGEN SATURATION: 96 % | HEIGHT: 71 IN | BODY MASS INDEX: 31.08 KG/M2 | HEART RATE: 96 BPM | WEIGHT: 222 LBS | SYSTOLIC BLOOD PRESSURE: 127 MMHG | DIASTOLIC BLOOD PRESSURE: 79 MMHG

## 2024-03-08 DIAGNOSIS — J18.9 RECURRENT PNEUMONIA: Primary | ICD-10-CM

## 2024-03-08 DIAGNOSIS — R06.02 SOBOE (SHORTNESS OF BREATH ON EXERTION): ICD-10-CM

## 2024-03-08 DIAGNOSIS — I25.10 ATHEROSCLEROSIS OF NATIVE CORONARY ARTERY OF NATIVE HEART WITHOUT ANGINA PECTORIS: ICD-10-CM

## 2024-03-08 DIAGNOSIS — G47.33 OSA (OBSTRUCTIVE SLEEP APNEA): ICD-10-CM

## 2024-03-08 PROCEDURE — 3078F DIAST BP <80 MM HG: CPT | Mod: CPTII,S$GLB,, | Performed by: INTERNAL MEDICINE

## 2024-03-08 PROCEDURE — 3074F SYST BP LT 130 MM HG: CPT | Mod: CPTII,S$GLB,, | Performed by: INTERNAL MEDICINE

## 2024-03-08 PROCEDURE — 99999 PR PBB SHADOW E&M-EST. PATIENT-LVL IV: CPT | Mod: PBBFAC,,, | Performed by: INTERNAL MEDICINE

## 2024-03-08 PROCEDURE — 1159F MED LIST DOCD IN RCRD: CPT | Mod: CPTII,S$GLB,, | Performed by: INTERNAL MEDICINE

## 2024-03-08 PROCEDURE — 3008F BODY MASS INDEX DOCD: CPT | Mod: CPTII,S$GLB,, | Performed by: INTERNAL MEDICINE

## 2024-03-08 PROCEDURE — 99204 OFFICE O/P NEW MOD 45 MIN: CPT | Mod: S$GLB,,, | Performed by: INTERNAL MEDICINE

## 2024-03-08 RX ORDER — MELOXICAM 7.5 MG/5ML
1 SUSPENSION ORAL DAILY PRN
COMMUNITY
Start: 2023-04-24

## 2024-03-08 RX ORDER — NITROGLYCERIN 0.4 MG/1
0.4 TABLET SUBLINGUAL EVERY 5 MIN PRN
COMMUNITY
Start: 2023-08-17

## 2024-03-08 NOTE — ASSESSMENT & PLAN NOTE
With an unclear pulmonary history. No firm prior diagnosis. Check PFTs, CT Chest. With the report of recurrent severe pneumonia and being on antibiotics for 9 months, he could have had  nocardia, lung abscess, or undertreated NTM. Bronchiectasis also possible.

## 2024-03-08 NOTE — PROGRESS NOTES
Subjective:       Patient ID: Saroj Zavala III is a 58 y.o. male.    Chief Complaint: Establish Care (Heart attack last August and SOB since; referred by cardiologist; had flu 2 mo ago chest congestion)    59 yo male with GREEN developed after inferior STEMI 6 months ago. It is slowly improving but still quite significant and is limiting his work. No chest pains. No syncope.    He has a history of severe PNA around age 42 requiring 9 months of antibiotics. He reports issue was diagnosed by his allergist and just remembers some kind of bacteria in his lungs. Since he has been easily prone to respiratory infections. Had an episode like this a few months back after flu but is now resolved.    ZOE on CPAP for 15 years. Finds he needs to absolutely use since his MI.    Shortness of Breath  This is a new problem. The current episode started more than 1 month ago. The problem occurs daily. The problem has been gradually improving. The average episode lasts 5 minutes. The symptoms are aggravated by exercise. The patient has no known risk factors for DVT/PE. He has tried rest for the symptoms. The treatment provided moderate relief. His past medical history is significant for CAD and pneumonia.   Review of Systems   Respiratory:  Positive for shortness of breath, orthopnea and dyspnea on extertion.    All other systems reviewed and are negative.      No past medical history on file.     History reviewed. No pertinent family history.   If not mentioned in HPI, Family history is reviewed and not contributory    Social History     Tobacco Use    Smoking status: Never    Smokeless tobacco: Never        Objective:        Vitals:    03/08/24 0821   BP: 127/79   Pulse: 96     Wt Readings from Last 3 Encounters:   03/08/24 100.7 kg (222 lb)   02/21/24 103.4 kg (227 lb 15.3 oz)   12/12/23 106.3 kg (234 lb 5.6 oz)     Temp Readings from Last 3 Encounters:   12/12/23 98.1 °F (36.7 °C) (Oral)   08/05/23 97.8 °F (36.6 °C) (Oral)    01/26/11 98.5 °F (36.9 °C)     BP Readings from Last 3 Encounters:   03/08/24 127/79   02/21/24 136/80   12/12/23 139/89     Pulse Readings from Last 3 Encounters:   03/08/24 96   02/21/24 62   12/12/23 90       Physical Exam   Constitutional: He is oriented to person, place, and time. He appears well-developed and well-nourished.   HENT:   Head: Normocephalic.   Mouth/Throat: Oropharynx is clear and moist.   Cardiovascular: Normal rate and regular rhythm.   Pulmonary/Chest: Normal expansion, symmetric chest wall expansion, effort normal and breath sounds normal. He has no wheezes.   Abdominal: Soft. He exhibits no distension.   Musculoskeletal:         General: No edema. Normal range of motion.   Lymphadenopathy: No supraclavicular adenopathy is present.     He has no cervical adenopathy.   Neurological: He is alert and oriented to person, place, and time. Gait normal.   Skin: Skin is warm and dry. No rash noted.   Psychiatric: He has a normal mood and affect. His behavior is normal. Thought content normal.   Vitals reviewed.    CBC  Lab Results   Component Value Date    WBC 14.05 (H) 08/03/2023    HGB 16.7 08/03/2023    HCT 48.5 08/03/2023    MCV 86 08/03/2023     08/03/2023         CMP  Sodium   Date Value Ref Range Status   08/05/2023 135 (L) 136 - 145 mmol/L Final     Potassium   Date Value Ref Range Status   08/05/2023 4.0 3.5 - 5.1 mmol/L Final     Chloride   Date Value Ref Range Status   08/05/2023 103 95 - 110 mmol/L Final     CO2   Date Value Ref Range Status   08/05/2023 29 23 - 29 mmol/L Final     Glucose   Date Value Ref Range Status   08/05/2023 97 70 - 110 mg/dL Final     BUN   Date Value Ref Range Status   08/05/2023 16 2 - 20 mg/dL Final     Creatinine   Date Value Ref Range Status   08/05/2023 0.90 0.50 - 1.40 mg/dL Final     Calcium   Date Value Ref Range Status   08/05/2023 9.1 8.7 - 10.5 mg/dL Final     Total Protein   Date Value Ref Range Status   08/03/2023 8.0 6.0 - 8.4 g/dL Final  "    Albumin   Date Value Ref Range Status   08/03/2023 4.6 3.5 - 5.2 g/dL Final     Total Bilirubin   Date Value Ref Range Status   08/03/2023 1.2 (H) 0.1 - 1.0 mg/dL Final     Comment:     For infants and newborns, interpretation of results should be based  on gestational age, weight and in agreement with clinical  observations.    Premature Infant recommended reference ranges:  Up to 24 hours.............<8.0 mg/dL  Up to 48 hours............<12.0 mg/dL  3-5 days..................<15.0 mg/dL  6-29 days.................<15.0 mg/dL       Alkaline Phosphatase   Date Value Ref Range Status   08/03/2023 44 38 - 126 U/L Final     AST   Date Value Ref Range Status   08/03/2023 37 15 - 46 U/L Final     ALT   Date Value Ref Range Status   08/03/2023 37 10 - 44 U/L Final     Anion Gap   Date Value Ref Range Status   08/05/2023 3 (L) 8 - 16 mmol/L Final     eGFR   Date Value Ref Range Status   08/05/2023 >60.0 >60 mL/min/1.73 m^2 Final       ABG  No results found for: "PH", "PO2", "PCO2"        Personal Diagnostic Review  I have personally reviewed the following data and added my own interpretation as below:  Chest x-ray: 12/12/23 normal  Echocardiogram: Preserved EF. No clear RV dysfunction or diastolic dysfunction even on Echo with documented diastolic CHF  LHC with RCA stent and LVEDP 22.      3/8/2024     8:21 AM 2/21/2024     9:49 AM 12/12/2023    11:10 PM 12/12/2023     8:47 PM 11/24/2023     9:46 AM 11/21/2023     2:02 PM 8/14/2023    10:51 AM   Pulmonary Function Tests   SpO2 96 % 96 % 98 % 99 %  99 % 98 %   Height 5' 11" (1.803 m) 5' 11" (1.803 m)  5' 11" (1.803 m) 5' 11" (1.803 m) 5' 11" (1.803 m) 5' 11" (1.803 m)   Weight 100.7 kg (222 lb) 103.4 kg (227 lb 15.3 oz)  106.3 kg (234 lb 5.6 oz) 103.4 kg (228 lb) 103.4 kg (228 lb) 103.5 kg (228 lb 3.2 oz)   BMI (Calculated) 31 31.8  32.7 31.8 31.8 31.8         Assessment:       1. Recurrent pneumonia    2. SOBOE (shortness of breath on exertion)    3. Atherosclerosis " of native coronary artery of native heart without angina pectoris    4. ZOE (obstructive sleep apnea)        Outpatient Encounter Medications as of 3/8/2024   Medication Sig Dispense Refill    meloxicam 7.5 mg/5 mL Susp Take 1 tablet by mouth daily as needed.      nitroGLYCERIN (NITROSTAT) 0.4 MG SL tablet Place 0.4 mg under the tongue every 5 (five) minutes as needed.      albuterol (PROVENTIL/VENTOLIN HFA) 90 mcg/actuation inhaler Inhale 2 puffs into the lungs every 6 (six) hours as needed for Wheezing (Cough). (Patient taking differently: Inhale 2 puffs into the lungs as needed for Wheezing (Cough).) 18 g 1    aspirin 81 MG Chew Take 1 tablet (81 mg total) by mouth once daily. 30 tablet 11    atorvastatin (LIPITOR) 40 MG tablet Take 1 tablet (40 mg total) by mouth every evening. 90 tablet 3    levothyroxine (SYNTHROID, LEVOTHROID) 175 MCG tablet Take 175 mcg by mouth once daily.      metoprolol tartrate (LOPRESSOR) 25 MG tablet Take 0.5 tablets (12.5 mg total) by mouth 2 (two) times daily. 45 tablet 3    TESTOSTERONE CYPIONATE IM Inject 50 mg into the muscle.      ticagrelor (BRILINTA) 90 mg tablet Take 1 tablet (90 mg total) by mouth 2 (two) times daily. 60 tablet 11     No facility-administered encounter medications on file as of 3/8/2024.     1. SOBOE (shortness of breath on exertion)  - Ambulatory referral/consult to Pulmonology  - Complete PFT with bronchodilator; Future    2. Recurrent pneumonia  - CT Chest Without Contrast; Future    3. Atherosclerosis of native coronary artery of native heart without angina pectoris    4. ZOE (obstructive sleep apnea)    Plan:     Problem List Items Addressed This Visit          Cardiac/Vascular    Atherosclerosis of native coronary artery of native heart without angina pectoris    Current Assessment & Plan     Almost certainly  of GREEN. LVEDP was 22 at time of cath. Given inferior STEMI, could have some affects on RV as well.  Cardiac Rehab.  Optimize  medical management per cardiology         SOBOE (shortness of breath on exertion)    Current Assessment & Plan     With an unclear pulmonary history. No firm prior diagnosis. Check PFTs, CT Chest. With the report of recurrent severe pneumonia and being on antibiotics for 9 months, he could have had  nocardia, lung abscess, or undertreated NTM. Bronchiectasis also possible.         Relevant Orders    Complete PFT with bronchodilator       Other    ZOE (obstructive sleep apnea)    Current Assessment & Plan     Remains on CPAP. Given been using for 15 years, may need repeat assessment if no further improvement.          Other Visit Diagnoses       Recurrent pneumonia    -  Primary    Relevant Orders    CT Chest Without Contrast              No follow-ups on file.    Future Appointments   Date Time Provider Department Center   5/27/2024  9:00 AM Taran Zhu NP [facility] CARDIO Ran Khan MD

## 2024-03-08 NOTE — ASSESSMENT & PLAN NOTE
Almost certainly  of GREEN. LVEDP was 22 at time of cath. Given inferior STEMI, could have some affects on RV as well.  Cardiac Rehab.  Optimize medical management per cardiology

## 2024-03-08 NOTE — ASSESSMENT & PLAN NOTE
Remains on CPAP. Given been using for 15 years, may need repeat assessment if no further improvement.

## 2024-04-01 ENCOUNTER — HOSPITAL ENCOUNTER (OUTPATIENT)
Dept: PULMONOLOGY | Facility: HOSPITAL | Age: 59
Discharge: HOME | End: 2024-04-01
Attending: INTERNAL MEDICINE
Payer: COMMERCIAL

## 2024-04-01 ENCOUNTER — HOSPITAL ENCOUNTER (OUTPATIENT)
Dept: RADIOLOGY | Facility: HOSPITAL | Age: 59
Discharge: HOME | End: 2024-04-01
Attending: INTERNAL MEDICINE
Payer: COMMERCIAL

## 2024-04-01 DIAGNOSIS — R06.02 SOBOE (SHORTNESS OF BREATH ON EXERTION): ICD-10-CM

## 2024-04-01 DIAGNOSIS — J18.9 RECURRENT PNEUMONIA: ICD-10-CM

## 2024-04-01 PROCEDURE — 71250 CT THORAX DX C-: CPT | Mod: TC

## 2024-04-01 PROCEDURE — 71250 CT THORAX DX C-: CPT | Mod: 26,,, | Performed by: RADIOLOGY

## 2024-04-03 LAB
DLCO SINGLE BREATH LLN: 23.01
DLCO SINGLE BREATH PRE REF: 98.1 %
DLCO SINGLE BREATH REF: 29.94
DLCOC SBVA LLN: 3.01
DLCOC SBVA REF: 4.2
DLCOC SINGLE BREATH LLN: 23.01
DLCOC SINGLE BREATH REF: 29.94
DLCOCSBVAULN: 5.39
DLCOCSINGLEBREATHULN: 36.87
DLCOSINGLEBREATHULN: 36.87
DLCOVA LLN: 3.01
DLCOVA PRE REF: 106.5 %
DLCOVA PRE: 4.47 ML/(MIN*MMHG*L) (ref 3.01–5.39)
DLCOVA REF: 4.2
DLCOVAULN: 5.39
ERV LLN: -16448.76
ERV PRE REF: 39.3 %
ERV REF: 1.24
ERVULN: ABNORMAL
FEF 25 75 LLN: 1.64
FEF 25 75 PRE REF: 59.8 %
FEF 25 75 REF: 3.21
FET100 CHG: -6.1 %
FEV05 LLN: 1.75
FEV05 REF: 2.88
FEV1 CHG: 8.3 %
FEV1 FVC LLN: 67
FEV1 FVC PRE REF: 96.8 %
FEV1 FVC REF: 78
FEV1 LLN: 2.84
FEV1 PRE REF: 81.3 %
FEV1 REF: 3.72
FEV1 VOL CHG: 0.25
FRCPLETH LLN: 2.59
FRCPLETH PREREF: 92.3 %
FRCPLETH REF: 3.57
FRCPLETHULN: 4.56
FVC CHG: 5.7 %
FVC LLN: 3.68
FVC PRE REF: 83.7 %
FVC REF: 4.78
FVC VOL CHG: 0.23
IVC PRE: 4.29 L (ref 3.68–5.89)
IVC SINGLE BREATH LLN: 3.68
IVC SINGLE BREATH PRE REF: 89.8 %
IVC SINGLE BREATH REF: 4.78
IVCSINGLEBREATHULN: 5.89
LLN IC: -9999996.62
PEF LLN: 7.18
PEF PRE REF: 83.1 %
PEF REF: 9.5
PHYSICIAN COMMENT: ABNORMAL
POST FEF 25 75: 2.25 L/S (ref 1.64–5.3)
POST FET 100: 7.53 SEC
POST FEV1 FVC: 77.45 % (ref 66.5–88.06)
POST FEV1: 3.27 L (ref 2.84–4.55)
POST FEV5: 2.34 L (ref 1.75–4.02)
POST FVC: 4.23 L (ref 3.68–5.89)
POST PEF: 8.65 L/S (ref 7.18–11.81)
PRE DLCO: 29.36 ML/(MIN*MMHG) (ref 23.01–36.87)
PRE ERV: 0.49 L (ref -16448.76–16451.24)
PRE FEF 25 75: 1.92 L/S (ref 1.64–5.3)
PRE FET 100: 8.02 SEC
PRE FEV05 REF: 73 %
PRE FEV1 FVC: 75.59 % (ref 66.5–88.06)
PRE FEV1: 3.02 L (ref 2.84–4.55)
PRE FEV5: 2.1 L (ref 1.75–4.02)
PRE FRC PL: 3.3 L (ref 2.59–4.56)
PRE FVC: 4 L (ref 3.68–5.89)
PRE IC: 3.8 L (ref -9999996.62–#######.####)
PRE PEF: 7.89 L/S (ref 7.18–11.81)
PRE REF IC: 112.2 %
PRE RV: 2.81 L (ref 1.66–3.01)
PRE TLC: 7.1 L (ref 5.97–8.28)
RAW PRE REF: 106.5 %
RAW PRE: 3.26 CMH2O*S/L (ref 3.06–3.06)
RAW REF: 3.06
REF IC: 3.38
RV LLN: 1.66
RV PRE REF: 120.6 %
RV REF: 2.33
RVTLC LLN: 27
RVTLC PRE REF: 110.6 %
RVTLC PRE: 39.61 % (ref 26.82–44.78)
RVTLC REF: 36
RVTLCULN: 45
RVULN: 3.01
SGAW PRE REF: 89.7 %
SGAW PRE: 0.07 1/(CMH2O*S) (ref 0.08–0.08)
SGAW REF: 0.08
TLC LLN: 5.97
TLC PRE REF: 99.6 %
TLC REF: 7.13
TLC ULN: 8.28
ULN IC: ABNORMAL
VA PRE: 6.56 L (ref 6.98–6.98)
VA SINGLE BREATH LLN: 6.98
VA SINGLE BREATH PRE REF: 94.1 %
VA SINGLE BREATH REF: 6.98
VASINGLEBREATHULN: 6.98
VC LLN: 3.68
VC PRE REF: 89.8 %
VC PRE: 4.29 L (ref 3.68–5.89)
VC REF: 4.78
VC ULN: 5.89

## 2024-04-03 PROCEDURE — 94060 EVALUATION OF WHEEZING: CPT | Mod: 26,S$GLB,, | Performed by: INTERNAL MEDICINE

## 2024-04-03 PROCEDURE — 94726 PLETHYSMOGRAPHY LUNG VOLUMES: CPT | Mod: 26,S$GLB,, | Performed by: INTERNAL MEDICINE

## 2024-04-03 PROCEDURE — 94729 DIFFUSING CAPACITY: CPT | Mod: 26,S$GLB,, | Performed by: INTERNAL MEDICINE

## 2024-04-05 DIAGNOSIS — R59.0 LOCALIZED ENLARGED LYMPH NODES: Primary | ICD-10-CM

## 2024-04-15 ENCOUNTER — PATIENT MESSAGE (OUTPATIENT)
Dept: PULMONOLOGY | Facility: CLINIC | Age: 59
End: 2024-04-15

## 2024-04-15 ENCOUNTER — OFFICE VISIT (OUTPATIENT)
Dept: PULMONOLOGY | Facility: CLINIC | Age: 59
End: 2024-04-15
Payer: COMMERCIAL

## 2024-04-15 VITALS
HEART RATE: 84 BPM | DIASTOLIC BLOOD PRESSURE: 84 MMHG | OXYGEN SATURATION: 96 % | WEIGHT: 229.06 LBS | BODY MASS INDEX: 32.07 KG/M2 | HEIGHT: 71 IN | SYSTOLIC BLOOD PRESSURE: 143 MMHG

## 2024-04-15 DIAGNOSIS — R06.02 SOBOE (SHORTNESS OF BREATH ON EXERTION): ICD-10-CM

## 2024-04-15 DIAGNOSIS — R59.0 MEDIASTINAL LYMPHADENOPATHY: ICD-10-CM

## 2024-04-15 DIAGNOSIS — I50.32 CHRONIC DIASTOLIC CHF (CONGESTIVE HEART FAILURE): Primary | ICD-10-CM

## 2024-04-15 PROCEDURE — 99214 OFFICE O/P EST MOD 30 MIN: CPT | Mod: S$GLB,,, | Performed by: INTERNAL MEDICINE

## 2024-04-15 PROCEDURE — 1159F MED LIST DOCD IN RCRD: CPT | Mod: CPTII,S$GLB,, | Performed by: INTERNAL MEDICINE

## 2024-04-15 PROCEDURE — 3077F SYST BP >= 140 MM HG: CPT | Mod: CPTII,S$GLB,, | Performed by: INTERNAL MEDICINE

## 2024-04-15 PROCEDURE — 99999 PR PBB SHADOW E&M-EST. PATIENT-LVL III: CPT | Mod: PBBFAC,,, | Performed by: INTERNAL MEDICINE

## 2024-04-15 PROCEDURE — 3079F DIAST BP 80-89 MM HG: CPT | Mod: CPTII,S$GLB,, | Performed by: INTERNAL MEDICINE

## 2024-04-15 PROCEDURE — 3008F BODY MASS INDEX DOCD: CPT | Mod: CPTII,S$GLB,, | Performed by: INTERNAL MEDICINE

## 2024-04-15 NOTE — PROGRESS NOTES
Subjective:       Patient ID: Saroj Zavala III is a 58 y.o. male.  The patient's last visit with me was on 3/8/2024.     Stable GREEN since last visit. Has not been following a strict low salt diet and discussed. Discussed daily exercise goals for deconditioning component. Discussed fu with cardiology. Discussed the mild lymphadenopathy, low risk and planned fu.  Review of Systems    Objective:      Vitals:    04/15/24 0849   BP: (!) 143/84   Pulse: 84     Wt Readings from Last 3 Encounters:   04/15/24 103.9 kg (229 lb 0.9 oz)   03/08/24 100.7 kg (222 lb)   02/21/24 103.4 kg (227 lb 15.3 oz)     Temp Readings from Last 3 Encounters:   12/12/23 98.1 °F (36.7 °C) (Oral)   08/05/23 97.8 °F (36.6 °C) (Oral)   01/26/11 98.5 °F (36.9 °C)     BP Readings from Last 3 Encounters:   04/15/24 (!) 143/84   03/08/24 127/79   02/21/24 136/80     Pulse Readings from Last 3 Encounters:   04/15/24 84   03/08/24 96   02/21/24 62       Physical Exam   Constitutional: He appears well-developed and well-nourished.   Pulmonary/Chest: He has no wheezes. He has no rales.   Vitals reviewed.    CBC  Lab Results   Component Value Date    WBC 14.05 (H) 08/03/2023    HGB 16.7 08/03/2023    HCT 48.5 08/03/2023    MCV 86 08/03/2023     08/03/2023         CMP  Sodium   Date Value Ref Range Status   08/05/2023 135 (L) 136 - 145 mmol/L Final     Potassium   Date Value Ref Range Status   08/05/2023 4.0 3.5 - 5.1 mmol/L Final     Chloride   Date Value Ref Range Status   08/05/2023 103 95 - 110 mmol/L Final     CO2   Date Value Ref Range Status   08/05/2023 29 23 - 29 mmol/L Final     Glucose   Date Value Ref Range Status   08/05/2023 97 70 - 110 mg/dL Final     BUN   Date Value Ref Range Status   08/05/2023 16 2 - 20 mg/dL Final     Creatinine   Date Value Ref Range Status   08/05/2023 0.90 0.50 - 1.40 mg/dL Final     Calcium   Date Value Ref Range Status   08/05/2023 9.1 8.7 - 10.5 mg/dL Final     Total Protein   Date Value Ref Range  "Status   08/03/2023 8.0 6.0 - 8.4 g/dL Final     Albumin   Date Value Ref Range Status   08/03/2023 4.6 3.5 - 5.2 g/dL Final     Total Bilirubin   Date Value Ref Range Status   08/03/2023 1.2 (H) 0.1 - 1.0 mg/dL Final     Comment:     For infants and newborns, interpretation of results should be based  on gestational age, weight and in agreement with clinical  observations.    Premature Infant recommended reference ranges:  Up to 24 hours.............<8.0 mg/dL  Up to 48 hours............<12.0 mg/dL  3-5 days..................<15.0 mg/dL  6-29 days.................<15.0 mg/dL       Alkaline Phosphatase   Date Value Ref Range Status   08/03/2023 44 38 - 126 U/L Final     AST   Date Value Ref Range Status   08/03/2023 37 15 - 46 U/L Final     ALT   Date Value Ref Range Status   08/03/2023 37 10 - 44 U/L Final     Anion Gap   Date Value Ref Range Status   08/05/2023 3 (L) 8 - 16 mmol/L Final     eGFR   Date Value Ref Range Status   08/05/2023 >60.0 >60 mL/min/1.73 m^2 Final       ABG  No results found for: "PH", "PO2", "PCO2"        Personal Diagnostic Review  I have personally reviewed the following data and added my own interpretation as below:  CT Chest 4/1/24 images personally reviewed and shows micronodules, difffuse mediastinal mild LAD  PFTs 4/1/24 normal      4/15/2024     8:49 AM 3/8/2024     8:21 AM 2/21/2024     9:49 AM 12/12/2023    11:10 PM 12/12/2023     8:47 PM 11/24/2023     9:46 AM 11/21/2023     2:02 PM   Pulmonary Function Tests   SpO2 96 % 96 % 96 % 98 % 99 %  99 %   Height 5' 11" (1.803 m) 5' 11" (1.803 m) 5' 11" (1.803 m)  5' 11" (1.803 m) 5' 11" (1.803 m) 5' 11" (1.803 m)   Weight 103.9 kg (229 lb 0.9 oz) 100.7 kg (222 lb) 103.4 kg (227 lb 15.3 oz)  106.3 kg (234 lb 5.6 oz) 103.4 kg (228 lb) 103.4 kg (228 lb)   BMI (Calculated) 32 31 31.8  32.7 31.8 31.8         Assessment:       1. Chronic diastolic CHF (congestive heart failure)    2. Mediastinal lymphadenopathy    3. SOBOE (shortness of breath " on exertion)        Outpatient Encounter Medications as of 4/15/2024   Medication Sig Dispense Refill    albuterol (PROVENTIL/VENTOLIN HFA) 90 mcg/actuation inhaler Inhale 2 puffs into the lungs every 6 (six) hours as needed for Wheezing (Cough). (Patient taking differently: Inhale 2 puffs into the lungs as needed for Wheezing (Cough).) 18 g 1    aspirin 81 MG Chew Take 1 tablet (81 mg total) by mouth once daily. 30 tablet 11    atorvastatin (LIPITOR) 40 MG tablet Take 1 tablet (40 mg total) by mouth every evening. 90 tablet 3    levothyroxine (SYNTHROID, LEVOTHROID) 175 MCG tablet Take 175 mcg by mouth once daily.      meloxicam 7.5 mg/5 mL Susp Take 1 tablet by mouth daily as needed.      metoprolol tartrate (LOPRESSOR) 25 MG tablet Take 0.5 tablets (12.5 mg total) by mouth 2 (two) times daily. 45 tablet 3    nitroGLYCERIN (NITROSTAT) 0.4 MG SL tablet Place 0.4 mg under the tongue every 5 (five) minutes as needed.      TESTOSTERONE CYPIONATE IM Inject 50 mg into the muscle.      ticagrelor (BRILINTA) 90 mg tablet Take 1 tablet (90 mg total) by mouth 2 (two) times daily. 60 tablet 11     No facility-administered encounter medications on file as of 4/15/2024.     1. Chronic diastolic CHF (congestive heart failure)    2. Mediastinal lymphadenopathy    3. SOBOE (shortness of breath on exertion)    Plan:     Problem List Items Addressed This Visit          Cardiac/Vascular    SOBOE (shortness of breath on exertion)    Current Assessment & Plan     PFTs and CT Chest without cause. No pulmonary etiology of SOB. Encouraged regular exercise         Chronic diastolic CHF (congestive heart failure) - Primary    Current Assessment & Plan     LVEDP 22 on [facility].   of dyspnea.  Discussed low salt diet.  Further medical treatment per cardiology. With his job, would have difficulty with diuretics and will defer to cardiology            Other    Mediastinal lymphadenopathy    Current Assessment & Plan     Suspect 2/2  diastolic CHF. Does have calcified micronodules which could be seen in old granulomatous disease. Suspicion for malignancy very low but repeat CT in 6 months to document stability.              No follow-ups on file.    Future Appointments   Date Time Provider Department Center   5/27/2024  9:00 AM Taran Zhu, JERRY [facility] CARDIO Ran Khan MD

## 2024-04-15 NOTE — ASSESSMENT & PLAN NOTE
LVEDP 22 on [facility].   of dyspnea.  Discussed low salt diet.  Further medical treatment per cardiology. With his job, would have difficulty with diuretics and will defer to cardiology

## 2024-04-15 NOTE — ASSESSMENT & PLAN NOTE
Suspect 2/2 diastolic CHF. Does have calcified micronodules which could be seen in old granulomatous disease. Suspicion for malignancy very low but repeat CT in 6 months to document stability.

## 2024-05-24 ENCOUNTER — PATIENT MESSAGE (OUTPATIENT)
Dept: CARDIOLOGY | Facility: CLINIC | Age: 59
End: 2024-05-24
Payer: COMMERCIAL

## 2024-08-16 ENCOUNTER — PATIENT MESSAGE (OUTPATIENT)
Dept: CARDIOLOGY | Facility: CLINIC | Age: 59
End: 2024-08-16
Payer: COMMERCIAL

## 2024-08-16 DIAGNOSIS — I25.10 ATHEROSCLEROSIS OF NATIVE CORONARY ARTERY OF NATIVE HEART WITHOUT ANGINA PECTORIS: Primary | ICD-10-CM

## 2024-08-16 RX ORDER — ATORVASTATIN CALCIUM 40 MG/1
40 TABLET, FILM COATED ORAL NIGHTLY
Qty: 90 TABLET | Refills: 3 | Status: SHIPPED | OUTPATIENT
Start: 2024-08-16 | End: 2025-08-16

## 2024-09-13 DIAGNOSIS — I21.11 ST ELEVATION MYOCARDIAL INFARCTION INVOLVING RIGHT CORONARY ARTERY: ICD-10-CM

## 2024-09-16 DIAGNOSIS — I21.11 ST ELEVATION MYOCARDIAL INFARCTION INVOLVING RIGHT CORONARY ARTERY: ICD-10-CM

## 2024-09-23 ENCOUNTER — OFFICE VISIT (OUTPATIENT)
Dept: CARDIOLOGY | Facility: CLINIC | Age: 59
End: 2024-09-23
Payer: COMMERCIAL

## 2024-09-23 ENCOUNTER — PATIENT MESSAGE (OUTPATIENT)
Dept: CARDIOLOGY | Facility: CLINIC | Age: 59
End: 2024-09-23

## 2024-09-23 VITALS
HEART RATE: 62 BPM | DIASTOLIC BLOOD PRESSURE: 86 MMHG | SYSTOLIC BLOOD PRESSURE: 114 MMHG | HEIGHT: 71 IN | WEIGHT: 237.88 LBS | OXYGEN SATURATION: 96 % | BODY MASS INDEX: 33.3 KG/M2

## 2024-09-23 DIAGNOSIS — I50.32 CHRONIC DIASTOLIC CHF (CONGESTIVE HEART FAILURE): ICD-10-CM

## 2024-09-23 DIAGNOSIS — R59.0 MEDIASTINAL LYMPHADENOPATHY: ICD-10-CM

## 2024-09-23 DIAGNOSIS — I25.10 ATHEROSCLEROSIS OF NATIVE CORONARY ARTERY OF NATIVE HEART WITHOUT ANGINA PECTORIS: Primary | ICD-10-CM

## 2024-09-23 DIAGNOSIS — E66.09 CLASS 1 OBESITY DUE TO EXCESS CALORIES WITH SERIOUS COMORBIDITY AND BODY MASS INDEX (BMI) OF 33.0 TO 33.9 IN ADULT: ICD-10-CM

## 2024-09-23 DIAGNOSIS — R53.83 OTHER FATIGUE: ICD-10-CM

## 2024-09-23 DIAGNOSIS — R06.02 SOBOE (SHORTNESS OF BREATH ON EXERTION): ICD-10-CM

## 2024-09-23 DIAGNOSIS — E03.9 HYPOTHYROIDISM, UNSPECIFIED TYPE: ICD-10-CM

## 2024-09-23 DIAGNOSIS — G47.33 OSA (OBSTRUCTIVE SLEEP APNEA): ICD-10-CM

## 2024-09-23 PROCEDURE — 1160F RVW MEDS BY RX/DR IN RCRD: CPT | Mod: CPTII,S$GLB,,

## 2024-09-23 PROCEDURE — 99214 OFFICE O/P EST MOD 30 MIN: CPT | Mod: S$GLB,,,

## 2024-09-23 PROCEDURE — 99999 PR PBB SHADOW E&M-EST. PATIENT-LVL III: CPT | Mod: PBBFAC,,,

## 2024-09-23 PROCEDURE — 3079F DIAST BP 80-89 MM HG: CPT | Mod: CPTII,S$GLB,,

## 2024-09-23 PROCEDURE — 3074F SYST BP LT 130 MM HG: CPT | Mod: CPTII,S$GLB,,

## 2024-09-23 PROCEDURE — 3008F BODY MASS INDEX DOCD: CPT | Mod: CPTII,S$GLB,,

## 2024-09-23 PROCEDURE — 3044F HG A1C LEVEL LT 7.0%: CPT | Mod: CPTII,S$GLB,,

## 2024-09-23 PROCEDURE — 1159F MED LIST DOCD IN RCRD: CPT | Mod: CPTII,S$GLB,,

## 2024-09-23 RX ORDER — NAPROXEN SODIUM 220 MG/1
81 TABLET, FILM COATED ORAL DAILY
Qty: 90 TABLET | Refills: 3 | Status: SHIPPED | OUTPATIENT
Start: 2024-09-23 | End: 2025-09-23

## 2024-09-23 NOTE — ASSESSMENT & PLAN NOTE
"-Followed by Pulmonary last visit 4/14/24 : per note "Suspect 2/2 diastolic CHF. Does have calcified micronodules which could be seen in old granulomatous disease. Suspicion for malignancy very low but repeat CT in 6 months to document stability."   "

## 2024-09-23 NOTE — ASSESSMENT & PLAN NOTE
Patient is identified as having Diastolic (HFpEF) heart failure that is Chronic. CHF is currently controlled. Latest ECHO performed and demonstrates- Results for orders placed during the hospital encounter of 11/24/23    Echo Saline Bubble? No    Interpretation Summary    Left Ventricle: The left ventricle is normal in size. There is mild concentric hypertrophy. Normal wall motion. There is normal systolic function. Biplane (2D) method of discs ejection fraction is 55%. There is normal diastolic function.    Right Ventricle: Normal right ventricular cavity size. Wall thickness is normal. Right ventricle wall motion  is normal. Systolic function is normal.    Aortic Valve: There is mild aortic regurgitation.    Mitral Valve: There is mild regurgitation with a posterolateral eccentriccally directed jet.    Pulmonary Artery: The estimated pulmonary artery systolic pressure is 19 mmHg.    IVC/SVC: Intermediate venous pressure at 8 mmHg.  . Continue Beta Blocker and monitor clinical status closely. Monitor on telemetry. Patient is on CHF pathway.  Monitor strict Is&Os and daily weights.  Place on fluid restriction of . Cardiology has been consulted. Continue to stress to patient importance of self efficacy and  on diet for CHF. Last BNP reviewed- and noted below @LABRCNTIP(BNP,BNPTRIAGEBLO)@    -LVEDP 22 on [facility]   -continue medical therapy   -Low salt diet

## 2024-09-23 NOTE — ASSESSMENT & PLAN NOTE
"-Cardiac echo 11/24/23- LVEF 55% w normal diastolic function    Pulmonary medicine note 4/15/2024 "   PFTs and CT Chest without cause. No pulmonary etiology of SOB. Encouraged regular exercise"  "

## 2024-09-23 NOTE — PROGRESS NOTES
"Subjective:    Patient ID:  Saroj Zavala III is a 58 y.o. male who presents for follow-up of No chief complaint on file.      PCP: Shabbir Kirkland III, MD (Inactive)     Referring Provider:     HPI: Patient is a 56 yo m w/PMH CAD, inferior STEMI 8/3/23 S/P PCI mid RCA w OSVALDO X 1 Synergy XD 3.5x20MM by Dr. Kirkland, ZOE, SOB/GREEN, hypothyroidism, obesity, who presents today for f/u appt. He was last seen on 2/24/24 for f/u appt and was continued on medical therapy. He also continued to note SOB w normal LVEF and was referred to Pulmonary. He was last seen by Pulmonary on 4/15/24, no change in medical therapy w diet modifications. . Repeat cardiac echo w normal LVEF 55%. He continues to note some GREEN w exertion.   He denies CP, SOB, palpitations, orthopnea, PND, pre-syncope, LOC, or swelling. He notes medication compliance without side effects. He notes intermittent dietary  non-compliance with low fat/ low cholesterol diet. He also reports eating "bad " for the past 3 months. He notes weight gain and increased fatigue with weight gain.     2/24/24: Patient presents today for f/u appt. He was last seen on 11/21/23 for f/u appt and reported fatigue. He was continued on medical therapy w metoprolol decreased to 12.5 mg. Repeat cardiac echo w normal LVEF 55%. He continues to note some GREEN w exertion, but notes the fatigue has decreased with the lower dose of metoprolol.   He denies CP, SOB, palpitations, orthopnea, PND, pre-syncope, LOC, or swelling. He notes medication compliance without side effects. He notes intermittent dietary compliance with low fat/ low cholesterol diet.     11/21/23: Patient presents today for f/u appt. He was last seen on 8/14/23 S/P PCI to mid RCA and was continued on medical therapy. He reports doing well since last visit. He also reports feeling very sleepy and slow. He noted 1 episode of right sided CP which was relieved by 1 SL nitro.He denies CP, SOB, palpitations, orthopnea, PND, " pre-syncope, LOC, or swelling. He notes medication compliance without side effects. He notes intermittent dietary compliance with low fat/ low cholesterol diet.     8/14/23: Patient presented today to establish care and post procedure follow up. Patient presented to ED on 8/3/23 w c/o CP which started 3-4 HRs prior to arrival and he had stuttering cp which became constant over the past hour.  He denies sob however notes radiation of pain to his back and L arm. He was noted to be hemodynamically stable in the ED and given ASA, ticagrelor, and heparin bolus and was sent for emergent angiogram. Post angiogram received Tirofiban X 18 HR, repeat EKG reviewed w resolution of ST elevation. He was discharged home on medical therapy: Ticagrelor, ASA, metoprolol 25 mg, and Statin therapy.Since discharge patient reports doing well but notes some fatigue.  He denies CP, SOB, palpitations, orthopnea, PND, pre-syncope, LOC, or swelling. He notes medication compliance without side effects.     No past medical history on file.  Past Surgical History:   Procedure Laterality Date    IVUS, CORONARY  8/3/2023    Procedure: IVUS, Coronary;  Surgeon: Mt Kirkland III, MD;  Location: [facility] CATH LAB;  Service: Cardiology;;    LEFT HEART CATHETERIZATION Left 8/3/2023    Procedure: Left heart cath;  Surgeon: Mt Kirkland III, MD;  Location: [facility] CATH LAB;  Service: Cardiology;  Laterality: Left;    STENT, DRUG ELUTING, SINGLE VESSEL, CORONARY  8/3/2023    Procedure: Stent, Drug Eluting, Single Vessel, Coronary;  Surgeon: Mt Kirkland III, MD;  Location: [facility] CATH LAB;  Service: Cardiology;;     Social History     Socioeconomic History    Marital status:    Tobacco Use    Smoking status: Never     Passive exposure: Never    Smokeless tobacco: Never     Social Determinants of Health     Financial Resource Strain: Medium Risk (8/4/2023)    Overall Financial Resource Strain (CARDIA)     Difficulty of Paying Living  Expenses: Somewhat hard   Food Insecurity: No Food Insecurity (8/4/2023)    Hunger Vital Sign     Worried About Running Out of Food in the Last Year: Never true     Ran Out of Food in the Last Year: Never true   Transportation Needs: No Transportation Needs (8/4/2023)    PRAPARE - Transportation     Lack of Transportation (Medical): No     Lack of Transportation (Non-Medical): No   Physical Activity: Insufficiently Active (8/4/2023)    Exercise Vital Sign     Days of Exercise per Week: 3 days     Minutes of Exercise per Session: 40 min   Stress: Stress Concern Present (8/4/2023)    [employer] - Occupational Stress Questionnaire     Feeling of Stress : Rather much   Housing Stability: Unknown (8/4/2023)    Housing Stability Vital Sign     Unstable Housing in the Last Year: No     No family history on file.    Review of patient's allergies indicates:  No Known Allergies    Medication List with Changes/Refills   Current Medications    ALBUTEROL (PROVENTIL/VENTOLIN HFA) 90 MCG/ACTUATION INHALER    Inhale 2 puffs into the lungs every 6 (six) hours as needed for Wheezing (Cough).    ATORVASTATIN (LIPITOR) 40 MG TABLET    Take 1 tablet (40 mg total) by mouth every evening.    LEVOTHYROXINE (SYNTHROID, LEVOTHROID) 175 MCG TABLET    Take 175 mcg by mouth once daily.    MELOXICAM 7.5 MG/5 ML SUSP    Take 1 tablet by mouth daily as needed.    METOPROLOL TARTRATE (LOPRESSOR) 25 MG TABLET    Take 0.5 tablets (12.5 mg total) by mouth 2 (two) times daily.    NITROGLYCERIN (NITROSTAT) 0.4 MG SL TABLET    Place 0.4 mg under the tongue every 5 (five) minutes as needed.    TESTOSTERONE CYPIONATE IM    Inject 50 mg into the muscle.   Changed and/or Refilled Medications    Modified Medication Previous Medication    ASPIRIN 81 MG CHEW aspirin 81 MG Chew       Take 1 tablet (81 mg total) by mouth once daily.    Take 1 tablet (81 mg total) by mouth once daily.   Discontinued Medications    TICAGRELOR  "(BRILINTA) 90 MG TABLET    Take 1 tablet (90 mg total) by mouth 2 (two) times daily.       Review of Systems   Constitutional: Negative for diaphoresis and fever.   HENT:  Negative for congestion and hearing loss.    Eyes:  Negative for blurred vision and pain.   Cardiovascular:  Positive for dyspnea on exertion. Negative for chest pain, claudication, leg swelling, near-syncope, palpitations and syncope.   Respiratory:  Positive for snoring. Negative for shortness of breath and sleep disturbances due to breathing.    Hematologic/Lymphatic: Negative for bleeding problem. Does not bruise/bleed easily.   Skin:  Negative for color change and poor wound healing.   Gastrointestinal:  Negative for abdominal pain and nausea.   Genitourinary:  Negative for bladder incontinence and flank pain.   Neurological:  Negative for focal weakness and light-headedness.        Objective:   /86 (BP Location: Left arm, Patient Position: Sitting, BP Method: Large (Manual))   Pulse 62   Ht 5' 11" (1.803 m)   Wt 107.9 kg (237 lb 14 oz)   SpO2 96%   BMI 33.18 kg/m²    Physical Exam  Constitutional:       Appearance: He is well-developed. He is not diaphoretic.   HENT:      Head: Normocephalic and atraumatic.   Eyes:      General: No scleral icterus.     Pupils: Pupils are equal, round, and reactive to light.   Neck:      Vascular: No JVD.   Cardiovascular:      Rate and Rhythm: Normal rate and regular rhythm.      Pulses: Intact distal pulses.      Heart sounds: S1 normal and S2 normal. No murmur heard.     No friction rub. No gallop.   Pulmonary:      Effort: Pulmonary effort is normal. No respiratory distress.      Breath sounds: Normal breath sounds. No wheezing or rales.   Chest:      Chest wall: No tenderness.   Abdominal:      General: Bowel sounds are normal. There is no distension.      Palpations: Abdomen is soft. There is no mass.      Tenderness: There is no abdominal tenderness. There is no rebound.   Musculoskeletal:   "       General: No tenderness. Normal range of motion.      Cervical back: Normal range of motion and neck supple.   Skin:     General: Skin is warm and dry.      Coloration: Skin is not pale.   Neurological:      Mental Status: He is alert and oriented to person, place, and time.      Coordination: Coordination normal.      Deep Tendon Reflexes: Reflexes normal.   Psychiatric:         Behavior: Behavior normal.         Judgment: Judgment normal.             Cardiac echo 11/24/23  Summary    Left Ventricle: The left ventricle is normal in size. There is mild concentric hypertrophy. Normal wall motion. There is normal systolic function. Biplane (2D) method of discs ejection fraction is 55%. There is normal diastolic function.    Right Ventricle: Normal right ventricular cavity size. Wall thickness is normal. Right ventricle wall motion  is normal. Systolic function is normal.    Aortic Valve: There is mild aortic regurgitation.    Mitral Valve: There is mild regurgitation with a posterolateral eccentriccally directed jet.    Pulmonary Artery: The estimated pulmonary artery systolic pressure is 19 mmHg.    IVC/SVC: Intermediate venous pressure at 8 mmHg.        Cardiac echo 8/4/23  Summary    Left Ventricle: The left ventricle is normal in size. Normal wall thickness. Normal wall motion. There is normal systolic function with a visually estimated ejection fraction of 60 - 65%. There is normal diastolic function.    Right Ventricle: Normal right ventricular cavity size. Systolic function is normal.    Aortic Valve: The aortic valve is a trileaflet valve. There is mild aortic regurgitation with a centrally directed jet.    Mitral Valve: There is no stenosis. There is mild regurgitation with a centrally directed jet.    Tricuspid Valve: There is mild regurgitation with a centrally directed jet.    Pulmonic Valve: There is no significant stenosis. The estimated PA systolic pressure is 36 mmHg.    IVC/SVC: Intermediate  venous pressure at 8 mmHg.      Cardiac angiogram 8/3/23  Summary    There was single vessel coronary artery disease with a 75% thrombotic tubular stenosis of the mid RCA with distal embolization to daital RPLV status post successful PCI with a 3.5x20 mm OSVALDO with postdilation with a 4.0x20 mm NCB with excellent angiographic results.    The Mid RCA lesion was 75% stenosed with 0% stenosis post-intervention.    The left ventricular end diastolic pressure was elevated, LVEDP 22 mmHg.    The pre-procedure left ventricular end diastolic pressure was 22.    Mid RCA lesion: A STENT SYNERGY XD 3.5X20MM stent was successfully placed at 16 NABOR for 20 sec.    The estimated blood loss was <50 mL.    Recommendations     Routine post PCI care.   Maximize medical management.   ASA 81mg.   Cardiac rehab referral.   Weight loss.   Statin therapy.   Ticagrelor (Brilinta) for one year.       Assessment:       1. Atherosclerosis of native coronary artery of native heart without angina pectoris    2. SOBOE (shortness of breath on exertion)    3. Chronic diastolic CHF (congestive heart failure)    4. Hypothyroidism, unspecified type    5. Class 1 obesity due to excess calories with serious comorbidity and body mass index (BMI) of 33.0 to 33.9 in adult    6. ZOE (obstructive sleep apnea)    7. Other fatigue    8. Mediastinal lymphadenopathy         Plan:         Atherosclerosis of native coronary artery of native heart without angina pectoris  S/P [facility] 8/3/23  Summary     There was single vessel coronary artery disease with a 75% thrombotic tubular stenosis of the mid RCA with distal embolization to daital RPLV status post successful PCI with a 3.5x20 mm OSVALDO with postdilation with a 4.0x20 mm NCB with excellent angiographic results.    The Mid RCA lesion was 75% stenosed with 0% stenosis post-intervention.    Mid RCA lesion: A STENT SYNERGY XD 3.5X20MM stent was successfully placed at 16 NABOR for 20 sec     -continue ASA 81 mg,  -continue  "metoprolol to 12.5 mg as patient reports sleepiness and tired feeling   -continue Ticagrelor X 1 year ( completed Sept. 24)  -Cardiac rehab   -continue Statin therapy- atorvastatin 40 mg     SOBOE (shortness of breath on exertion)  -Cardiac echo 11/24/23- LVEF 55% w normal diastolic function    Pulmonary medicine note 4/15/2024 "   PFTs and CT Chest without cause. No pulmonary etiology of SOB. Encouraged regular exercise"    Chronic diastolic CHF (congestive heart failure)  Patient is identified as having Diastolic (HFpEF) heart failure that is Chronic. CHF is currently controlled. Latest ECHO performed and demonstrates- Results for orders placed during the hospital encounter of 11/24/23    Echo Saline Bubble? No    Interpretation Summary    Left Ventricle: The left ventricle is normal in size. There is mild concentric hypertrophy. Normal wall motion. There is normal systolic function. Biplane (2D) method of discs ejection fraction is 55%. There is normal diastolic function.    Right Ventricle: Normal right ventricular cavity size. Wall thickness is normal. Right ventricle wall motion  is normal. Systolic function is normal.    Aortic Valve: There is mild aortic regurgitation.    Mitral Valve: There is mild regurgitation with a posterolateral eccentriccally directed jet.    Pulmonary Artery: The estimated pulmonary artery systolic pressure is 19 mmHg.    IVC/SVC: Intermediate venous pressure at 8 mmHg.  . Continue Beta Blocker and monitor clinical status closely. Monitor on telemetry. Patient is on CHF pathway.  Monitor strict Is&Os and daily weights.  Place on fluid restriction of . Cardiology has been consulted. Continue to stress to patient importance of self efficacy and  on diet for CHF. Last BNP reviewed- and noted below @LABRCNTIP(BNP,BNPTRIAGEBLO)@    -LVEDP 22 on [facility]   -continue medical therapy   -Low salt diet     Hypothyroidism  -followed by PCP  -Continue medical therapy- synthroid 175 mcg " "    Class 1 obesity due to excess calories with serious comorbidity and body mass index (BMI) of 33.0 to 33.9 in adult  -discussed lifestyle modifications- diet exercise, weight loss  -notes he has made diet modifications to cardiac healthy diet   -10 pound weight gain since last visit       ZOE (obstructive sleep apnea)  -compliant w nightly CPAP     Other fatigue  -Cardiac echo 11/24/23- LVEF 55% w normal diastolic function     Mediastinal lymphadenopathy  -Followed by Pulmonary last visit 4/14/24 : per note "Suspect 2/2 diastolic CHF. Does have calcified micronodules which could be seen in old granulomatous disease. Suspicion for malignancy very low but repeat CT in 6 months to document stability."     Total duration of face to face visit time 30 minutes.  Total time spent counseling greater than fifty percent of total visit time.  Counseling included discussion regarding imaging findings, diagnosis, possibilities, treatment options, risks and benefits.  The patient had many questions regarding the options and long-term effects      Taran Zhu,DNP  Cardiology         "

## 2024-09-23 NOTE — ASSESSMENT & PLAN NOTE
S/P [facility] 8/3/23  Summary     There was single vessel coronary artery disease with a 75% thrombotic tubular stenosis of the mid RCA with distal embolization to daital RPLV status post successful PCI with a 3.5x20 mm OSVALDO with postdilation with a 4.0x20 mm NCB with excellent angiographic results.    The Mid RCA lesion was 75% stenosed with 0% stenosis post-intervention.    Mid RCA lesion: A STENT SYNERGY XD 3.5X20MM stent was successfully placed at 16 NABOR for 20 sec     -continue ASA 81 mg,  -continue metoprolol to 12.5 mg as patient reports sleepiness and tired feeling   -continue Ticagrelor X 1 year ( completed Sept. 24)  -Cardiac rehab   -continue Statin therapy- atorvastatin 40 mg

## 2024-09-23 NOTE — ASSESSMENT & PLAN NOTE
-discussed lifestyle modifications- diet exercise, weight loss  -notes he has made diet modifications to cardiac healthy diet   -10 pound weight gain since last visit

## 2024-10-11 ENCOUNTER — TELEPHONE (OUTPATIENT)
Dept: CARDIOLOGY | Facility: CLINIC | Age: 59
End: 2024-10-11
Payer: COMMERCIAL

## 2024-10-11 ENCOUNTER — PATIENT MESSAGE (OUTPATIENT)
Dept: CARDIOLOGY | Facility: CLINIC | Age: 59
End: 2024-10-11
Payer: COMMERCIAL

## 2024-10-11 NOTE — TELEPHONE ENCOUNTER
Pt reached out to cancel appt but couldn't and I reached out to check to see if he was having any problems and he stated no he tried to cancel but it wouldn't cancel.  Thank you,    Rocio Miranda  Medical Assistant     Taran Zhu, DNP  [facility] - Cardiology

## 2025-01-27 ENCOUNTER — OFFICE VISIT (OUTPATIENT)
Dept: CARDIOLOGY | Facility: CLINIC | Age: 60
End: 2025-01-27
Payer: COMMERCIAL

## 2025-01-27 VITALS
OXYGEN SATURATION: 98 % | HEIGHT: 71 IN | DIASTOLIC BLOOD PRESSURE: 81 MMHG | HEART RATE: 70 BPM | BODY MASS INDEX: 33 KG/M2 | SYSTOLIC BLOOD PRESSURE: 122 MMHG | WEIGHT: 235.69 LBS

## 2025-01-27 DIAGNOSIS — E66.811 CLASS 1 OBESITY DUE TO EXCESS CALORIES WITH SERIOUS COMORBIDITY AND BODY MASS INDEX (BMI) OF 33.0 TO 33.9 IN ADULT: ICD-10-CM

## 2025-01-27 DIAGNOSIS — R59.0 MEDIASTINAL LYMPHADENOPATHY: ICD-10-CM

## 2025-01-27 DIAGNOSIS — I25.10 ATHEROSCLEROSIS OF NATIVE CORONARY ARTERY OF NATIVE HEART WITHOUT ANGINA PECTORIS: Primary | ICD-10-CM

## 2025-01-27 DIAGNOSIS — G47.33 OSA (OBSTRUCTIVE SLEEP APNEA): ICD-10-CM

## 2025-01-27 DIAGNOSIS — R06.02 SOBOE (SHORTNESS OF BREATH ON EXERTION): ICD-10-CM

## 2025-01-27 DIAGNOSIS — E66.09 CLASS 1 OBESITY DUE TO EXCESS CALORIES WITH SERIOUS COMORBIDITY AND BODY MASS INDEX (BMI) OF 33.0 TO 33.9 IN ADULT: ICD-10-CM

## 2025-01-27 DIAGNOSIS — I50.32 CHRONIC DIASTOLIC CHF (CONGESTIVE HEART FAILURE): ICD-10-CM

## 2025-01-27 DIAGNOSIS — E03.9 HYPOTHYROIDISM, UNSPECIFIED TYPE: ICD-10-CM

## 2025-01-27 PROCEDURE — 3079F DIAST BP 80-89 MM HG: CPT | Mod: CPTII,S$GLB,,

## 2025-01-27 PROCEDURE — 99214 OFFICE O/P EST MOD 30 MIN: CPT | Mod: S$GLB,,,

## 2025-01-27 PROCEDURE — 1159F MED LIST DOCD IN RCRD: CPT | Mod: CPTII,S$GLB,,

## 2025-01-27 PROCEDURE — 1160F RVW MEDS BY RX/DR IN RCRD: CPT | Mod: CPTII,S$GLB,,

## 2025-01-27 PROCEDURE — 3074F SYST BP LT 130 MM HG: CPT | Mod: CPTII,S$GLB,,

## 2025-01-27 PROCEDURE — 99999 PR PBB SHADOW E&M-EST. PATIENT-LVL III: CPT | Mod: PBBFAC,,,

## 2025-01-27 PROCEDURE — 3008F BODY MASS INDEX DOCD: CPT | Mod: CPTII,S$GLB,,

## 2025-01-27 PROCEDURE — G2211 COMPLEX E/M VISIT ADD ON: HCPCS | Mod: S$GLB,,,

## 2025-01-27 RX ORDER — LEVOTHYROXINE SODIUM 200 UG/1
200 TABLET ORAL
COMMUNITY
Start: 2024-12-24

## 2025-01-27 RX ORDER — METOPROLOL TARTRATE 25 MG/1
12.5 TABLET, FILM COATED ORAL 2 TIMES DAILY
Qty: 45 TABLET | Refills: 7 | Status: SHIPPED | OUTPATIENT
Start: 2025-01-27 | End: 2026-01-27

## 2025-01-27 NOTE — ASSESSMENT & PLAN NOTE
Patient is identified as having Diastolic (HFpEF) heart failure that is Chronic. CHF is currently controlled. Latest ECHO performed and demonstrates- Results for orders placed during the hospital encounter of 11/24/23    Echo Saline Bubble? No    Interpretation Summary    Left Ventricle: The left ventricle is normal in size. There is mild concentric hypertrophy. Normal wall motion. There is normal systolic function. Biplane (2D) method of discs ejection fraction is 55%. There is normal diastolic function.    Right Ventricle: Normal right ventricular cavity size. Wall thickness is normal. Right ventricle wall motion  is normal. Systolic function is normal.    Aortic Valve: There is mild aortic regurgitation.    Mitral Valve: There is mild regurgitation with a posterolateral eccentriccally directed jet.    Pulmonary Artery: The estimated pulmonary artery systolic pressure is 19 mmHg.    IVC/SVC: Intermediate venous pressure at 8 mmHg.  . Continue Beta Blocker and monitor clinical status closely. Monitor on telemetry. Patient is on CHF pathway.  Monitor strict Is&Os and daily weights.  Place on fluid restriction of . Cardiology has been consulted. Continue to stress to patient importance of self efficacy and  on diet for CHF. Last BNP reviewed- and noted below @LABRCNTIP(BNP,BNPTRIAGEBLO)@    -LVEDP 22 on [facility]   -continue medical therapy   -Low salt diet   -repeat cardiac echo to evaluate heart function

## 2025-01-27 NOTE — PROGRESS NOTES
Subjective:    Patient ID:  Saroj Zavala III is a 59 y.o. male who presents for follow-up of No chief complaint on file.      PCP: Shabbir Kirkland III, MD (Inactive)     Referring Provider:     HPI: Patient is a 58 yo m w/PMH CAD, inferior STEMI 8/3/23 S/P PCI mid RCA w OSVALDO X 1 Synergy XD 3.5x20MM by Dr. Kirkland, ZOE, SOB/GREEN, hypothyroidism, obesity, who presents today for f/u appt. He was last seen on 9/23/24 for f/u appt and was continued on medical therapy. He also continued to note SOB w normal LVEF and was referred to Pulmonary. He was last seen by Pulmonary on 4/15/24, no change in medical therapy w diet modifications. He continues to note some GREEN w exertion.   He denies CP, SOB, palpitations, orthopnea, PND, pre-syncope, LOC, or swelling. He notes medication compliance without side effects. He notes intermittent dietary  non-compliance with low fat/ low cholesterol diet. He has lost weight since last visit.     No past medical history on file.  Past Surgical History:   Procedure Laterality Date    IVUS, CORONARY  8/3/2023    Procedure: IVUS, Coronary;  Surgeon: Mt Kirkland III, MD;  Location: [facility] CATH LAB;  Service: Cardiology;;    LEFT HEART CATHETERIZATION Left 8/3/2023    Procedure: Left heart cath;  Surgeon: Mt Kirkland III, MD;  Location: [facility] CATH LAB;  Service: Cardiology;  Laterality: Left;    STENT, DRUG ELUTING, SINGLE VESSEL, CORONARY  8/3/2023    Procedure: Stent, Drug Eluting, Single Vessel, Coronary;  Surgeon: Mt Kirkland III, MD;  Location: [facility] CATH LAB;  Service: Cardiology;;     Social History     Socioeconomic History    Marital status:    Tobacco Use    Smoking status: Never     Passive exposure: Never    Smokeless tobacco: Never     Social Drivers of Health     Financial Resource Strain: Medium Risk (8/4/2023)    Overall Financial Resource Strain (CARDIA)     Difficulty of Paying Living Expenses: Somewhat hard   Food Insecurity: No Food  Insecurity (8/4/2023)    Hunger Vital Sign     Worried About Running Out of Food in the Last Year: Never true     Ran Out of Food in the Last Year: Never true   Transportation Needs: No Transportation Needs (8/4/2023)    PRAPARE - Transportation     Lack of Transportation (Medical): No     Lack of Transportation (Non-Medical): No   Physical Activity: Insufficiently Active (8/4/2023)    Exercise Vital Sign     Days of Exercise per Week: 3 days     Minutes of Exercise per Session: 40 min   Stress: Stress Concern Present (8/4/2023)    [employer] [place] [employer] - Occupational Stress Questionnaire     Feeling of Stress : Rather much   Housing Stability: Unknown (8/4/2023)    Housing Stability Vital Sign     Unstable Housing in the Last Year: No     No family history on file.    Review of patient's allergies indicates:  No Known Allergies    Medication List with Changes/Refills   Current Medications    ALBUTEROL (PROVENTIL/VENTOLIN HFA) 90 MCG/ACTUATION INHALER    Inhale 2 puffs into the lungs every 6 (six) hours as needed for Wheezing (Cough).    ASPIRIN 81 MG CHEW    Take 1 tablet (81 mg total) by mouth once daily.    ATORVASTATIN (LIPITOR) 40 MG TABLET    Take 1 tablet (40 mg total) by mouth every evening.    LEVOTHYROXINE (SYNTHROID) 200 MCG TABLET    Take 200 mcg by mouth before breakfast.    MELOXICAM 7.5 MG/5 ML SUSP    Take 1 tablet by mouth daily as needed.    METOPROLOL TARTRATE (LOPRESSOR) 25 MG TABLET    Take 0.5 tablets (12.5 mg total) by mouth 2 (two) times daily.    NITROGLYCERIN (NITROSTAT) 0.4 MG SL TABLET    Place 0.4 mg under the tongue every 5 (five) minutes as needed.    TESTOSTERONE CYPIONATE IM    Inject 50 mg into the muscle.   Discontinued Medications    LEVOTHYROXINE (SYNTHROID, LEVOTHROID) 175 MCG TABLET    Take 175 mcg by mouth once daily.       Review of Systems   Constitutional: Negative for diaphoresis and fever.   HENT:  Negative for congestion and hearing loss.    Eyes:   "Negative for blurred vision and pain.   Cardiovascular:  Positive for dyspnea on exertion. Negative for chest pain, claudication, leg swelling, near-syncope, palpitations and syncope.   Respiratory:  Positive for snoring. Negative for shortness of breath and sleep disturbances due to breathing.    Hematologic/Lymphatic: Negative for bleeding problem. Does not bruise/bleed easily.   Skin:  Negative for color change and poor wound healing.   Gastrointestinal:  Negative for abdominal pain and nausea.   Genitourinary:  Negative for bladder incontinence and flank pain.   Neurological:  Negative for focal weakness and light-headedness.        Objective:   /81 (BP Location: Left arm, Patient Position: Sitting)   Pulse 70   Ht 5' 11" (1.803 m)   Wt 106.9 kg (235 lb 10.8 oz)   SpO2 98%   BMI 32.87 kg/m²    Physical Exam  Constitutional:       Appearance: He is well-developed. He is not diaphoretic.   HENT:      Head: Normocephalic and atraumatic.   Eyes:      General: No scleral icterus.     Pupils: Pupils are equal, round, and reactive to light.   Neck:      Vascular: No JVD.   Cardiovascular:      Rate and Rhythm: Normal rate and regular rhythm.      Pulses: Intact distal pulses.      Heart sounds: S1 normal and S2 normal. No murmur heard.     No friction rub. No gallop.   Pulmonary:      Effort: Pulmonary effort is normal. No respiratory distress.      Breath sounds: Normal breath sounds. No wheezing or rales.   Chest:      Chest wall: No tenderness.   Abdominal:      General: Bowel sounds are normal. There is no distension.      Palpations: Abdomen is soft. There is no mass.      Tenderness: There is no abdominal tenderness. There is no rebound.   Musculoskeletal:         General: No tenderness. Normal range of motion.      Cervical back: Normal range of motion and neck supple.   Skin:     General: Skin is warm and dry.      Coloration: Skin is not pale.   Neurological:      Mental Status: He is alert and " oriented to person, place, and time.      Coordination: Coordination normal.      Deep Tendon Reflexes: Reflexes normal.   Psychiatric:         Behavior: Behavior normal.         Judgment: Judgment normal.             Cardiac echo 11/24/23  Summary    Left Ventricle: The left ventricle is normal in size. There is mild concentric hypertrophy. Normal wall motion. There is normal systolic function. Biplane (2D) method of discs ejection fraction is 55%. There is normal diastolic function.    Right Ventricle: Normal right ventricular cavity size. Wall thickness is normal. Right ventricle wall motion  is normal. Systolic function is normal.    Aortic Valve: There is mild aortic regurgitation.    Mitral Valve: There is mild regurgitation with a posterolateral eccentriccally directed jet.    Pulmonary Artery: The estimated pulmonary artery systolic pressure is 19 mmHg.    IVC/SVC: Intermediate venous pressure at 8 mmHg.        Cardiac echo 8/4/23  Summary    Left Ventricle: The left ventricle is normal in size. Normal wall thickness. Normal wall motion. There is normal systolic function with a visually estimated ejection fraction of 60 - 65%. There is normal diastolic function.    Right Ventricle: Normal right ventricular cavity size. Systolic function is normal.    Aortic Valve: The aortic valve is a trileaflet valve. There is mild aortic regurgitation with a centrally directed jet.    Mitral Valve: There is no stenosis. There is mild regurgitation with a centrally directed jet.    Tricuspid Valve: There is mild regurgitation with a centrally directed jet.    Pulmonic Valve: There is no significant stenosis. The estimated PA systolic pressure is 36 mmHg.    IVC/SVC: Intermediate venous pressure at 8 mmHg.      Cardiac angiogram 8/3/23  Summary    There was single vessel coronary artery disease with a 75% thrombotic tubular stenosis of the mid RCA with distal embolization to daital RPLV status post successful PCI with a  3.5x20 mm OSVALDO with postdilation with a 4.0x20 mm NCB with excellent angiographic results.    The Mid RCA lesion was 75% stenosed with 0% stenosis post-intervention.    The left ventricular end diastolic pressure was elevated, LVEDP 22 mmHg.    The pre-procedure left ventricular end diastolic pressure was 22.    Mid RCA lesion: A STENT SYNERGY XD 3.5X20MM stent was successfully placed at 16 NABOR for 20 sec.    The estimated blood loss was <50 mL.    Recommendations     Routine post PCI care.   Maximize medical management.   ASA 81mg.   Cardiac rehab referral.   Weight loss.   Statin therapy.   Ticagrelor (Brilinta) for one year.       Assessment:       1. Atherosclerosis of native coronary artery of native heart without angina pectoris    2. Chronic diastolic CHF (congestive heart failure)    3. SOBOE (shortness of breath on exertion)    4. Class 1 obesity due to excess calories with serious comorbidity and body mass index (BMI) of 33.0 to 33.9 in adult    5. Hypothyroidism, unspecified type    6. ZOE (obstructive sleep apnea)    7. Mediastinal lymphadenopathy           Plan:         Atherosclerosis of native coronary artery of native heart without angina pectoris  S/P [facility] 8/3/23  Summary     There was single vessel coronary artery disease with a 75% thrombotic tubular stenosis of the mid RCA with distal embolization to daital RPLV status post successful PCI with a 3.5x20 mm OSVALDO with postdilation with a 4.0x20 mm NCB with excellent angiographic results.    The Mid RCA lesion was 75% stenosed with 0% stenosis post-intervention.    Mid RCA lesion: A STENT SYNERGY XD 3.5X20MM stent was successfully placed at 16 NABOR for 20 sec     -continue ASA 81 mg,  -continue metoprolol to 12.5 mg as patient reports sleepiness and tired feeling   -continue Ticagrelor X 1 year ( completed Sept. 24)  -Cardiac rehab   -continue Statin therapy- atorvastatin 40 mg     Chronic diastolic CHF (congestive heart failure)  Patient is identified  "as having Diastolic (HFpEF) heart failure that is Chronic. CHF is currently controlled. Latest ECHO performed and demonstrates- Results for orders placed during the hospital encounter of 11/24/23    Echo Saline Bubble? No    Interpretation Summary    Left Ventricle: The left ventricle is normal in size. There is mild concentric hypertrophy. Normal wall motion. There is normal systolic function. Biplane (2D) method of discs ejection fraction is 55%. There is normal diastolic function.    Right Ventricle: Normal right ventricular cavity size. Wall thickness is normal. Right ventricle wall motion  is normal. Systolic function is normal.    Aortic Valve: There is mild aortic regurgitation.    Mitral Valve: There is mild regurgitation with a posterolateral eccentriccally directed jet.    Pulmonary Artery: The estimated pulmonary artery systolic pressure is 19 mmHg.    IVC/SVC: Intermediate venous pressure at 8 mmHg.  . Continue Beta Blocker and monitor clinical status closely. Monitor on telemetry. Patient is on CHF pathway.  Monitor strict Is&Os and daily weights.  Place on fluid restriction of . Cardiology has been consulted. Continue to stress to patient importance of self efficacy and  on diet for CHF. Last BNP reviewed- and noted below @LABRCNTIP(BNP,BNPTRIAGEBLO)@    -LVEDP 22 on [facility]   -continue medical therapy   -Low salt diet   -repeat cardiac echo to evaluate heart function     SOBOE (shortness of breath on exertion)  -Cardiac echo 11/24/23- LVEF 55% w normal diastolic function    Pulmonary medicine note 4/15/2024 "   PFTs and CT Chest without cause. No pulmonary etiology of SOB. Encouraged regular exercise"    Class 1 obesity due to excess calories with serious comorbidity and body mass index (BMI) of 33.0 to 33.9 in adult  -discussed lifestyle modifications- diet exercise, weight loss  -notes he has made diet modifications to cardiac healthy diet   -10 pound weight gain since last visit   Body mass " "index is 32.87 kg/m². Morbid obesity complicates all aspects of disease management from diagnostic modalities to treatment. Weight loss encouraged and health benefits explained to patient.       Hypothyroidism  -followed by PCP  -Continue medical therapy- synthroid 175 mcg     ZOE (obstructive sleep apnea)  -compliant w nightly CPAP     Mediastinal lymphadenopathy  -Followed by Pulmonary last visit 4/14/24 : per note "Suspect 2/2 diastolic CHF. Does have calcified micronodules which could be seen in old granulomatous disease. Suspicion for malignancy very low but repeat CT in 6 months to document stability."       Total duration of face to face visit time 30 minutes.  Total time spent counseling greater than fifty percent of total visit time.  Counseling included discussion regarding imaging findings, diagnosis, possibilities, treatment options, risks and benefits.  The patient had many questions regarding the options and long-term effects      Taran Zhu,DEBBIE  Cardiology       "

## 2025-01-27 NOTE — ASSESSMENT & PLAN NOTE
-discussed lifestyle modifications- diet exercise, weight loss  -notes he has made diet modifications to cardiac healthy diet   -10 pound weight gain since last visit   Body mass index is 32.87 kg/m². Morbid obesity complicates all aspects of disease management from diagnostic modalities to treatment. Weight loss encouraged and health benefits explained to patient.

## 2025-01-27 NOTE — ASSESSMENT & PLAN NOTE
S/P [facility] 8/3/23  Summary     There was single vessel coronary artery disease with a 75% thrombotic tubular stenosis of the mid RCA with distal embolization to daital RPLV status post successful PCI with a 3.5x20 mm OSVALDO with postdilation with a 4.0x20 mm NCB with excellent angiographic results.    The Mid RCA lesion was 75% stenosed with 0% stenosis post-intervention.    Mid RCA lesion: A STENT SYNERGY XD 3.5X20MM stent was successfully placed at 16 NABOR for 20 sec     -continue ASA 81 mg,  -continue metoprolol to 12.5 mg as patient reports sleepiness and tired feeling   -continue Ticagrelor X 1 year ( completed Sept. 24)  -Cardiac rehab   -continue Statin therapy- atorvastatin 40 mg

## 2025-03-10 ENCOUNTER — HOSPITAL ENCOUNTER (OUTPATIENT)
Dept: CARDIOLOGY | Facility: HOSPITAL | Age: 60
Discharge: HOME | End: 2025-03-10
Payer: COMMERCIAL

## 2025-03-10 VITALS
HEART RATE: 69 BPM | SYSTOLIC BLOOD PRESSURE: 122 MMHG | HEIGHT: 71 IN | DIASTOLIC BLOOD PRESSURE: 81 MMHG | BODY MASS INDEX: 32.9 KG/M2 | WEIGHT: 235 LBS

## 2025-03-10 DIAGNOSIS — I50.32 CHRONIC DIASTOLIC CHF (CONGESTIVE HEART FAILURE): ICD-10-CM

## 2025-03-10 LAB
AORTIC ROOT ANNULUS: 2.49 CM
AORTIC VALVE CUSP SEPERATION: 1.98 CM
APICAL FOUR CHAMBER EJECTION FRACTION: 66 %
APICAL TWO CHAMBER EJECTION FRACTION: 69 %
ASCENDING AORTA: 2.88 CM
AV INDEX (PROSTH): 0.87
AV MEAN GRADIENT: 3 MMHG
AV PEAK GRADIENT: 5 MMHG
AV REGURGITATION PRESSURE HALF TIME: 548 MS
AV VALVE AREA BY VELOCITY RATIO: 3.1 CM²
AV VALVE AREA: 3.3 CM²
AV VELOCITY RATIO: 0.82
BSA FOR ECHO PROCEDURE: 2.31 M2
CV ECHO LV RWT: 0.46 CM
DOP CALC AO PEAK VEL: 1.1 M/S
DOP CALC AO VTI: 22.1 CM
DOP CALC LVOT AREA: 3.8 CM2
DOP CALC LVOT DIAMETER: 2.2 CM
DOP CALC LVOT PEAK VEL: 0.9 M/S
DOP CALC LVOT STROKE VOLUME: 73.3 CM3
DOP CALC RVOT PEAK VEL: 0.88 M/S
DOP CALC RVOT VTI: 21.6 CM
DOP CALCLVOT PEAK VEL VTI: 19.3 CM
E WAVE DECELERATION TIME: 211 MSEC
E/A RATIO: 1.26
E/E' RATIO: 9 M/S
ECHO LV POSTERIOR WALL: 1.3 CM (ref 0.6–1.1)
EJECTION FRACTION: 60 %
FRACTIONAL SHORTENING: 35.1 % (ref 28–44)
INTERVENTRICULAR SEPTUM: 1.2 CM (ref 0.6–1.1)
IVC DIAMETER: 2 CM
LA MAJOR: 5.3 CM
LA MINOR: 5.6 CM
LA WIDTH: 3.9 CM
LEFT ATRIUM AREA SYSTOLIC (APICAL 2 CHAMBER): 20.32 CM2
LEFT ATRIUM AREA SYSTOLIC (APICAL 4 CHAMBER): 18.9 CM2
LEFT ATRIUM SIZE: 4.4 CM
LEFT ATRIUM VOLUME INDEX MOD: 26 ML/M2
LEFT ATRIUM VOLUME INDEX: 35 ML/M2
LEFT ATRIUM VOLUME MOD: 58 ML
LEFT ATRIUM VOLUME: 79 CM3
LEFT INTERNAL DIMENSION IN SYSTOLE: 3.7 CM (ref 2.1–4)
LEFT VENTRICLE DIASTOLIC VOLUME INDEX: 69.47 ML/M2
LEFT VENTRICLE DIASTOLIC VOLUME: 157 ML
LEFT VENTRICLE END DIASTOLIC VOLUME APICAL 2 CHAMBER: 66.94 ML
LEFT VENTRICLE END DIASTOLIC VOLUME APICAL 4 CHAMBER: 100.74 ML
LEFT VENTRICLE END SYSTOLIC VOLUME APICAL 2 CHAMBER: 59.67 ML
LEFT VENTRICLE END SYSTOLIC VOLUME APICAL 4 CHAMBER: 53.95 ML
LEFT VENTRICLE MASS INDEX: 135.1 G/M2
LEFT VENTRICLE SYSTOLIC VOLUME INDEX: 26.1 ML/M2
LEFT VENTRICLE SYSTOLIC VOLUME: 59 ML
LEFT VENTRICULAR INTERNAL DIMENSION IN DIASTOLE: 5.7 CM (ref 3.5–6)
LEFT VENTRICULAR MASS: 305.3 G
LV LATERAL E/E' RATIO: 7.7 M/S
LV SEPTAL E/E' RATIO: 9.6 M/S
LVED V (TEICH): 157.2 ML
LVES V (TEICH): 59.06 ML
LVOT MG: 1.81 MMHG
LVOT MV: 0.64 CM/S
MV A" WAVE DURATION": 119.89 MSEC
MV PEAK A VEL: 0.61 M/S
MV PEAK E VEL: 0.77 M/S
MV STENOSIS PRESSURE HALF TIME: 61.12 MS
MV VALVE AREA P 1/2 METHOD: 3.6 CM2
OHS CV RV/LV RATIO: 0.42 CM
OHS LV EJECTION FRACTION SIMPSONS BIPLANE MOD: 66 %
PISA AR MAX VEL: 1.74 M/S
PISA TR MAX VEL: 2.3 M/S
PULM VEIN S/D RATIO: 0.78
PV MEAN GRADIENT: 2 MMHG
PV MV: 0.56 M/S
PV PEAK D VEL: 0.4 M/S
PV PEAK GRADIENT: 2 MMHG
PV PEAK S VEL: 0.31 M/S
PV PEAK VELOCITY: 0.73 M/S
RA MAJOR: 4.6 CM
RA PRESSURE ESTIMATED: 3 MMHG
RA WIDTH: 3.32 CM
RIGHT VENTRICLE DIASTOLIC BASEL DIMENSION: 2.4 CM
RIGHT VENTRICULAR END-DIASTOLIC DIMENSION: 2.44 CM
RV TB RVSP: 5 MMHG
STJ: 2.83 CM
TDI LATERAL: 0.1 M/S
TDI SEPTAL: 0.08 M/S
TDI: 0.09 M/S
TR MAX PG: 21 MMHG
TRICUSPID ANNULAR PLANE SYSTOLIC EXCURSION: 2.46 CM
TV REST PULMONARY ARTERY PRESSURE: 24 MMHG
Z-SCORE OF LEFT VENTRICULAR DIMENSION IN END DIASTOLE: -3.75
Z-SCORE OF LEFT VENTRICULAR DIMENSION IN END SYSTOLE: -2.41

## 2025-03-10 PROCEDURE — 93306 TTE W/DOPPLER COMPLETE: CPT | Mod: PO

## 2025-03-10 PROCEDURE — 93306 TTE W/DOPPLER COMPLETE: CPT | Mod: 26,,, | Performed by: INTERNAL MEDICINE

## 2025-03-11 ENCOUNTER — TELEPHONE (OUTPATIENT)
Dept: CARDIOLOGY | Facility: CLINIC | Age: 60
End: 2025-03-11
Payer: COMMERCIAL

## 2025-03-11 NOTE — TELEPHONE ENCOUNTER
I reached out to Mr. Zavala and informed Him of his results & HE expressed understanding of the results.    Rocio Miranda  Medical Assistant  [facility]  [phone] - Office  [phone] - Fax          ----- Message from Taran Zhu NP sent at 3/10/2025  5:03 PM CDT -----  Please inform, Mr. Zavala the heart function is within normal limits. Please continue all medications as prescribed.    Thank you,  Taran Zhu DNP   ----- Message -----  From: Jovany Avila MD  Sent: 3/10/2025  12:40 PM CDT  To: Taran Zhu NP

## 2025-05-05 ENCOUNTER — OFFICE VISIT (OUTPATIENT)
Dept: CARDIOLOGY | Facility: CLINIC | Age: 60
End: 2025-05-05
Payer: COMMERCIAL

## 2025-05-05 VITALS
HEIGHT: 71 IN | DIASTOLIC BLOOD PRESSURE: 80 MMHG | SYSTOLIC BLOOD PRESSURE: 131 MMHG | BODY MASS INDEX: 34.07 KG/M2 | OXYGEN SATURATION: 95 % | WEIGHT: 243.38 LBS | HEART RATE: 69 BPM

## 2025-05-05 DIAGNOSIS — E66.09 CLASS 1 OBESITY DUE TO EXCESS CALORIES WITH SERIOUS COMORBIDITY AND BODY MASS INDEX (BMI) OF 33.0 TO 33.9 IN ADULT: ICD-10-CM

## 2025-05-05 DIAGNOSIS — R06.02 SOBOE (SHORTNESS OF BREATH ON EXERTION): ICD-10-CM

## 2025-05-05 DIAGNOSIS — I25.10 ATHEROSCLEROSIS OF NATIVE CORONARY ARTERY OF NATIVE HEART WITHOUT ANGINA PECTORIS: ICD-10-CM

## 2025-05-05 DIAGNOSIS — R53.83 OTHER FATIGUE: ICD-10-CM

## 2025-05-05 DIAGNOSIS — E03.9 HYPOTHYROIDISM, UNSPECIFIED TYPE: ICD-10-CM

## 2025-05-05 DIAGNOSIS — G47.33 OSA (OBSTRUCTIVE SLEEP APNEA): ICD-10-CM

## 2025-05-05 DIAGNOSIS — I50.32 CHRONIC DIASTOLIC CHF (CONGESTIVE HEART FAILURE): Primary | ICD-10-CM

## 2025-05-05 DIAGNOSIS — R59.0 MEDIASTINAL LYMPHADENOPATHY: ICD-10-CM

## 2025-05-05 DIAGNOSIS — E66.811 CLASS 1 OBESITY DUE TO EXCESS CALORIES WITH SERIOUS COMORBIDITY AND BODY MASS INDEX (BMI) OF 33.0 TO 33.9 IN ADULT: ICD-10-CM

## 2025-05-05 PROCEDURE — 1160F RVW MEDS BY RX/DR IN RCRD: CPT | Mod: CPTII,S$GLB,,

## 2025-05-05 PROCEDURE — 3075F SYST BP GE 130 - 139MM HG: CPT | Mod: CPTII,S$GLB,,

## 2025-05-05 PROCEDURE — 3079F DIAST BP 80-89 MM HG: CPT | Mod: CPTII,S$GLB,,

## 2025-05-05 PROCEDURE — 1159F MED LIST DOCD IN RCRD: CPT | Mod: CPTII,S$GLB,,

## 2025-05-05 PROCEDURE — G2211 COMPLEX E/M VISIT ADD ON: HCPCS | Mod: S$GLB,,,

## 2025-05-05 PROCEDURE — 99214 OFFICE O/P EST MOD 30 MIN: CPT | Mod: S$GLB,,,

## 2025-05-05 PROCEDURE — 3008F BODY MASS INDEX DOCD: CPT | Mod: CPTII,S$GLB,,

## 2025-05-05 PROCEDURE — 99999 PR PBB SHADOW E&M-EST. PATIENT-LVL III: CPT | Mod: PBBFAC,,,

## 2025-05-05 NOTE — PROGRESS NOTES
Subjective:    Patient ID:  Saroj Zavala III is a 59 y.o. male who presents for follow-up of No chief complaint on file.      PCP: Shabbir Kirkland III, MD (Inactive)     Referring Provider:     HPI: Patient is a 60 yo m w/PMH CAD, inferior STEMI 8/3/23 S/P PCI mid RCA w OSVALDO X 1 Synergy XD 3.5x20MM by Dr. Kirkland, ZOE, SOB/GREEN, hypothyroidism, obesity, who presents today for f/u appt, CAD, HF management.  He was last seen on 1/27/25 for f/u appt and was continued on medical therapy. He also continued to note SOB w normal LVEF and was referred to Pulmonary. He was last seen by Pulmonary on 4/15/24, no change in medical therapy w diet modifications. He continued  to note some GREEN w exertion. Repeat cardiac echo completed w normal LVEF 66% w normal diastolic function    He denies CP, SOB, palpitations, orthopnea, PND, pre-syncope, LOC, or swelling. He notes medication compliance without side effects. He notes intermittent dietary  non-compliance with low fat/ low cholesterol diet. He has lost weight since last visit.     No past medical history on file.  Past Surgical History:   Procedure Laterality Date    IVUS, CORONARY  8/3/2023    Procedure: IVUS, Coronary;  Surgeon: Mt Kirkland III, MD;  Location: [facility] CATH LAB;  Service: Cardiology;;    LEFT HEART CATHETERIZATION Left 8/3/2023    Procedure: Left heart cath;  Surgeon: Mt Kirkland III, MD;  Location: [facility] CATH LAB;  Service: Cardiology;  Laterality: Left;    STENT, DRUG ELUTING, SINGLE VESSEL, CORONARY  8/3/2023    Procedure: Stent, Drug Eluting, Single Vessel, Coronary;  Surgeon: Mt Kirkland III, MD;  Location: [facility] CATH LAB;  Service: Cardiology;;     Social History     Socioeconomic History    Marital status:    Tobacco Use    Smoking status: Never     Passive exposure: Never    Smokeless tobacco: Never     Social Drivers of Health     Financial Resource Strain: Medium Risk (8/4/2023)    Overall Financial Resource Strain  (CARDIA)     Difficulty of Paying Living Expenses: Somewhat hard   Food Insecurity: No Food Insecurity (8/4/2023)    Hunger Vital Sign     Worried About Running Out of Food in the Last Year: Never true     Ran Out of Food in the Last Year: Never true   Transportation Needs: No Transportation Needs (8/4/2023)    PRAPARE - Transportation     Lack of Transportation (Medical): No     Lack of Transportation (Non-Medical): No   Physical Activity: Insufficiently Active (8/4/2023)    Exercise Vital Sign     Days of Exercise per Week: 3 days     Minutes of Exercise per Session: 40 min   Stress: Stress Concern Present (8/4/2023)    [employer] - Occupational Stress Questionnaire     Feeling of Stress : Rather much   Housing Stability: Unknown (8/4/2023)    Housing Stability Vital Sign     Unstable Housing in the Last Year: No     No family history on file.    Review of patient's allergies indicates:  No Known Allergies    Medication List with Changes/Refills   Current Medications    ALBUTEROL (PROVENTIL/VENTOLIN HFA) 90 MCG/ACTUATION INHALER    Inhale 2 puffs into the lungs every 6 (six) hours as needed for Wheezing (Cough).    ASPIRIN 81 MG CHEW    Take 1 tablet (81 mg total) by mouth once daily.    ATORVASTATIN (LIPITOR) 40 MG TABLET    Take 1 tablet (40 mg total) by mouth every evening.    LEVOTHYROXINE (SYNTHROID) 200 MCG TABLET    Take 200 mcg by mouth before breakfast.    MELOXICAM 7.5 MG/5 ML SUSP    Take 1 tablet by mouth daily as needed.    METOPROLOL TARTRATE (LOPRESSOR) 25 MG TABLET    Take 0.5 tablets (12.5 mg total) by mouth 2 (two) times daily.    NITROGLYCERIN (NITROSTAT) 0.4 MG SL TABLET    Place 0.4 mg under the tongue every 5 (five) minutes as needed.    TESTOSTERONE CYPIONATE IM    Inject 50 mg into the muscle.       Review of Systems   Constitutional: Negative for diaphoresis and fever.   HENT:  Negative for congestion and hearing loss.    Eyes:  Negative for blurred vision and  "pain.   Cardiovascular:  Positive for dyspnea on exertion. Negative for chest pain, claudication, leg swelling, near-syncope, palpitations and syncope.   Respiratory:  Positive for snoring. Negative for shortness of breath and sleep disturbances due to breathing.    Hematologic/Lymphatic: Negative for bleeding problem. Does not bruise/bleed easily.   Skin:  Negative for color change and poor wound healing.   Gastrointestinal:  Negative for abdominal pain and nausea.   Genitourinary:  Negative for bladder incontinence and flank pain.   Neurological:  Negative for focal weakness and light-headedness.        Objective:   /80 (BP Location: Left arm, Patient Position: Sitting)   Pulse 69   Ht 5' 11" (1.803 m)   Wt 110.4 kg (243 lb 6.2 oz)   SpO2 95%   BMI 33.95 kg/m²    Physical Exam  Constitutional:       Appearance: He is well-developed. He is not diaphoretic.   HENT:      Head: Normocephalic and atraumatic.   Eyes:      General: No scleral icterus.     Pupils: Pupils are equal, round, and reactive to light.   Neck:      Vascular: No JVD.   Cardiovascular:      Rate and Rhythm: Normal rate and regular rhythm.      Pulses: Intact distal pulses.      Heart sounds: S1 normal and S2 normal. No murmur heard.     No friction rub. No gallop.   Pulmonary:      Effort: Pulmonary effort is normal. No respiratory distress.      Breath sounds: Normal breath sounds. No wheezing or rales.   Chest:      Chest wall: No tenderness.   Abdominal:      General: Bowel sounds are normal. There is no distension.      Palpations: Abdomen is soft. There is no mass.      Tenderness: There is no abdominal tenderness. There is no rebound.   Musculoskeletal:         General: No tenderness. Normal range of motion.      Cervical back: Normal range of motion and neck supple.   Skin:     General: Skin is warm and dry.      Coloration: Skin is not pale.   Neurological:      Mental Status: He is alert and oriented to person, place, and time. "      Coordination: Coordination normal.      Deep Tendon Reflexes: Reflexes normal.   Psychiatric:         Behavior: Behavior normal.         Judgment: Judgment normal.           Cardiac echo 3/10/2025  Summary    Left Ventricle: The left ventricle is normal in size. Normal wall thickness. There is concentric hypertrophy. There is normal systolic function. Ejection fraction is approximately 60%. Quantitated ejection fraction is 66%. There is normal diastolic function.    Right Ventricle: The right ventricle is normal in size. Wall thickness is normal. Systolic function is normal.    Left Atrium: mildly dilated    Mitral Valve: There is mild regurgitation with a centrally directed jet.    Pulmonary Artery: The estimated pulmonary artery systolic pressure is 24 mmHg.    IVC/SVC: Normal venous pressure at 3 mmHg.    Cardiac echo 11/24/23  Summary    Left Ventricle: The left ventricle is normal in size. There is mild concentric hypertrophy. Normal wall motion. There is normal systolic function. Biplane (2D) method of discs ejection fraction is 55%. There is normal diastolic function.    Right Ventricle: Normal right ventricular cavity size. Wall thickness is normal. Right ventricle wall motion  is normal. Systolic function is normal.    Aortic Valve: There is mild aortic regurgitation.    Mitral Valve: There is mild regurgitation with a posterolateral eccentriccally directed jet.    Pulmonary Artery: The estimated pulmonary artery systolic pressure is 19 mmHg.    IVC/SVC: Intermediate venous pressure at 8 mmHg.        Cardiac echo 8/4/23  Summary    Left Ventricle: The left ventricle is normal in size. Normal wall thickness. Normal wall motion. There is normal systolic function with a visually estimated ejection fraction of 60 - 65%. There is normal diastolic function.    Right Ventricle: Normal right ventricular cavity size. Systolic function is normal.    Aortic Valve: The aortic valve is a trileaflet valve. There is  mild aortic regurgitation with a centrally directed jet.    Mitral Valve: There is no stenosis. There is mild regurgitation with a centrally directed jet.    Tricuspid Valve: There is mild regurgitation with a centrally directed jet.    Pulmonic Valve: There is no significant stenosis. The estimated PA systolic pressure is 36 mmHg.    IVC/SVC: Intermediate venous pressure at 8 mmHg.      Cardiac angiogram 8/3/23  Summary    There was single vessel coronary artery disease with a 75% thrombotic tubular stenosis of the mid RCA with distal embolization to daital RPLV status post successful PCI with a 3.5x20 mm OSVALDO with postdilation with a 4.0x20 mm NCB with excellent angiographic results.    The Mid RCA lesion was 75% stenosed with 0% stenosis post-intervention.    The left ventricular end diastolic pressure was elevated, LVEDP 22 mmHg.    The pre-procedure left ventricular end diastolic pressure was 22.    Mid RCA lesion: A STENT SYNERGY XD 3.5X20MM stent was successfully placed at 16 NABOR for 20 sec.    The estimated blood loss was <50 mL.    Recommendations     Routine post PCI care.   Maximize medical management.   ASA 81mg.   Cardiac rehab referral.   Weight loss.   Statin therapy.   Ticagrelor (Brilinta) for one year.       Assessment:       1. Chronic diastolic CHF (congestive heart failure)    2. SOBOE (shortness of breath on exertion)    3. Atherosclerosis of native coronary artery of native heart without angina pectoris    4. Hypothyroidism, unspecified type    5. Class 1 obesity due to excess calories with serious comorbidity and body mass index (BMI) of 33.0 to 33.9 in adult    6. ZOE (obstructive sleep apnea)    7. Other fatigue    8. Mediastinal lymphadenopathy         Plan:         Chronic diastolic CHF (congestive heart failure)  Patient has Diastolic (HFpEF) heart failure that is Chronic. On presentation their CHF was well compensated. Most recent BNP and echo results are listed below.  No results for  "input(s): "BNP" in the last 72 hours.  Latest ECHO  Results for orders placed during the hospital encounter of 03/10/25    Echo Saline Bubble? No; Ultrasound enhancing contrast? No    Interpretation Summary    Left Ventricle: The left ventricle is normal in size. Normal wall thickness. There is concentric hypertrophy. There is normal systolic function. Ejection fraction is approximately 60%. Quantitated ejection fraction is 66%. There is normal diastolic function.    Right Ventricle: The right ventricle is normal in size. Wall thickness is normal. Systolic function is normal.    Left Atrium: mildly dilated    Mitral Valve: There is mild regurgitation with a centrally directed jet.    Pulmonary Artery: The estimated pulmonary artery systolic pressure is 24 mmHg.    IVC/SVC: Normal venous pressure at 3 mmHg.    Current Heart Failure Medications       Plan  - continue medical therapy- metoprolol 12.5 mg    -Low salt diet         SOBOE (shortness of breath on exertion)  -Cardiac echo 11/24/23- LVEF 55% w normal diastolic function    Pulmonary medicine note 4/15/2024 "   PFTs and CT Chest without cause. No pulmonary etiology of SOB. Encouraged regular exercise"    Atherosclerosis of native coronary artery of native heart without angina pectoris  S/P C 8/3/23  Summary     There was single vessel coronary artery disease with a 75% thrombotic tubular stenosis of the mid RCA with distal embolization to daital RPLV status post successful PCI with a 3.5x20 mm OSVALDO with postdilation with a 4.0x20 mm NCB with excellent angiographic results.    The Mid RCA lesion was 75% stenosed with 0% stenosis post-intervention.    Mid RCA lesion: A STENT SYNERGY XD 3.5X20MM stent was successfully placed at 16 NABOR for 20 sec     -continue ASA 81 mg,  -continue metoprolol to 12.5 mg as patient reports sleepiness and tired feeling   -continue Ticagrelor X 1 year ( completed Sept. 24)  -Cardiac rehab   -continue Statin therapy- atorvastatin 40 " "mg     Hypothyroidism  -followed by PCP  -Continue medical therapy- synthroid 175 mcg     Class 1 obesity due to excess calories with serious comorbidity and body mass index (BMI) of 33.0 to 33.9 in adult  -discussed lifestyle modifications- diet exercise, weight loss  -notes he has made diet modifications to cardiac healthy diet   -10 pound weight gain since last visit   Body mass index is 33.95 kg/m². Morbid obesity complicates all aspects of disease management from diagnostic modalities to treatment. Weight loss encouraged and health benefits explained to patient.       ZOE (obstructive sleep apnea)  -compliant w nightly CPAP     Other fatigue  -Cardiac echo 11/24/23- LVEF 55% w normal diastolic function   -Cardiac echo 3/10/2035- LVEF 66% w normal diastolic function     Mediastinal lymphadenopathy  -Followed by Pulmonary last visit 4/14/24 : per note "Suspect 2/2 diastolic CHF. Does have calcified micronodules which could be seen in old granulomatous disease. Suspicion for malignancy very low but repeat CT in 6 months to document stability."         Total duration of face to face visit time 30 minutes.  Total time spent counseling greater than fifty percent of total visit time.  Counseling included discussion regarding imaging findings, diagnosis, possibilities, treatment options, risks and benefits.  The patient had many questions regarding the options and long-term effects      Taran Zhu,DEBBIE  Cardiology         "

## 2025-05-05 NOTE — ASSESSMENT & PLAN NOTE
"Patient has Diastolic (HFpEF) heart failure that is Chronic. On presentation their CHF was well compensated. Most recent BNP and echo results are listed below.  No results for input(s): "BNP" in the last 72 hours.  Latest ECHO  Results for orders placed during the hospital encounter of 03/10/25    Echo Saline Bubble? No; Ultrasound enhancing contrast? No    Interpretation Summary    Left Ventricle: The left ventricle is normal in size. Normal wall thickness. There is concentric hypertrophy. There is normal systolic function. Ejection fraction is approximately 60%. Quantitated ejection fraction is 66%. There is normal diastolic function.    Right Ventricle: The right ventricle is normal in size. Wall thickness is normal. Systolic function is normal.    Left Atrium: mildly dilated    Mitral Valve: There is mild regurgitation with a centrally directed jet.    Pulmonary Artery: The estimated pulmonary artery systolic pressure is 24 mmHg.    IVC/SVC: Normal venous pressure at 3 mmHg.    Current Heart Failure Medications       Plan  - continue medical therapy- metoprolol 12.5 mg    -Low salt diet       "

## 2025-05-05 NOTE — ASSESSMENT & PLAN NOTE
S/P [facility] 8/3/23  Summary     There was single vessel coronary artery disease with a 75% thrombotic tubular stenosis of the mid RCA with distal embolization to daital RPLV status post successful PCI with a 3.5x20 mm OSVALDO with postdilation with a 4.0x20 mm NCB with excellent angiographic results.    The Mid RCA lesion was 75% stenosed with 0% stenosis post-intervention.    Mid RCA lesion: A STENT SYNERGY XD 3.5X20MM stent was successfully placed at 16 NABOR for 20 sec     -continue ASA 81 mg,  -continue metoprolol to 12.5 mg as patient reports sleepiness and tired feeling   -continue Ticagrelor X 1 year ( completed Sept. 24)  -Cardiac rehab   -continue Statin therapy- atorvastatin 40 mg

## 2025-05-05 NOTE — ASSESSMENT & PLAN NOTE
-Cardiac echo 11/24/23- LVEF 55% w normal diastolic function   -Cardiac echo 3/10/2035- LVEF 66% w normal diastolic function

## 2025-05-05 NOTE — ASSESSMENT & PLAN NOTE
-discussed lifestyle modifications- diet exercise, weight loss  -notes he has made diet modifications to cardiac healthy diet   -10 pound weight gain since last visit   Body mass index is 33.95 kg/m². Morbid obesity complicates all aspects of disease management from diagnostic modalities to treatment. Weight loss encouraged and health benefits explained to patient.

## 2025-08-04 ENCOUNTER — PATIENT MESSAGE (OUTPATIENT)
Dept: CARDIOLOGY | Facility: CLINIC | Age: 60
End: 2025-08-04
Payer: COMMERCIAL